# Patient Record
Sex: MALE | Race: WHITE | Employment: OTHER | URBAN - METROPOLITAN AREA
[De-identification: names, ages, dates, MRNs, and addresses within clinical notes are randomized per-mention and may not be internally consistent; named-entity substitution may affect disease eponyms.]

---

## 2018-07-23 ENCOUNTER — HOSPITAL ENCOUNTER (OUTPATIENT)
Dept: CT IMAGING | Age: 63
Discharge: HOME | End: 2018-07-23
Attending: FAMILY MEDICINE
Payer: SELF-PAY

## 2018-07-23 DIAGNOSIS — R93.1 ELEVATED CORONARY ARTERY CALCIUM SCORE: ICD-10-CM

## 2018-07-23 PROCEDURE — 75571 CT HRT W/O DYE W/CA TEST: CPT

## 2018-07-30 NOTE — PROGRESS NOTES
Patient reviewed Calcium score results in depth with Dr. Mitchel Portillo today. Lipitor dose increased and patient would like to proceed with referral to [facility].

## 2018-11-07 ENCOUNTER — HOSPITAL ENCOUNTER (OUTPATIENT)
Dept: LAB | Age: 63
Discharge: HOME | End: 2018-11-07

## 2018-11-07 PROCEDURE — 88305 TISSUE EXAM BY PATHOLOGIST: CPT

## 2018-12-18 ENCOUNTER — HOSPITAL ENCOUNTER (OUTPATIENT)
Dept: RADIATION ONCOLOGY | Age: 63
Discharge: HOME | End: 2018-12-18
Payer: COMMERCIAL

## 2018-12-18 VITALS
DIASTOLIC BLOOD PRESSURE: 73 MMHG | SYSTOLIC BLOOD PRESSURE: 135 MMHG | WEIGHT: 208.8 LBS | HEART RATE: 78 BPM | RESPIRATION RATE: 18 BRPM | OXYGEN SATURATION: 97 % | TEMPERATURE: 98.3 F | BODY MASS INDEX: 29.96 KG/M2

## 2018-12-18 PROCEDURE — 99211 OFF/OP EST MAY X REQ PHY/QHP: CPT

## 2018-12-18 NOTE — CONSULTS
Patient: Mary Webb MRN: [id number]  SSN: [id number]    YOB: 1955  Age: 58 y.o. Sex: male      Other Providers:    MD Zaida Augustin DO    CHIEF COMPLAINT: Prostate cancer    DIAGNOSIS: Adenocarcinoma of the prostate, T1c, [place] score 3+4 = 7, PSA 5.4      HISTORY OF PRESENT ILLNESS:  Mary Webb is a 58 y.o. male who I am seeing at the request of Dr. Sharon Hardwick for discussion of manage options for this intermediate risk prostate cancer. He has a history of penile cancer s/p local excision. The patient has the following PSA history:  PSA 5. 4 July 2018  PSA 4.0 July 2017  PSA 3. 5 July 2016  PSA 2. 9 July 2015  PSA 2. 6 June 2014  Based on his PSA rise, he was sent to Dr. Sharon Hardwick for evaluation. Biopsy on 11/07/18 revealed [place] score 3+4 = 7 adenocarcinoma in the right mid gland (3/3 cores, 90%, 75%, 50%) pattern 4 was 5%, PNI identified, and the right apex (<10%) pattern 4 was <5%. Prostate volume was 37.5 mL for a PSA density of 0.14. He discussed management options with Dr. Sharon Hardwick. He was then seen by Dr. Yaz Lundberg for discussion of RALP. He was felt to be a good surgical candidate. He is scheduled for an MRI of the pelvis on 01/03/19. He is here today for discussion of radiation based management further centimeters prostate cancer. At this time, Mr. Teetee Thomas Is doing very well. He has excellent performance status. He denies significant urinary symptoms. He has good bowel function. He has good erectile function.       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 PAST SURGICAL HISTORY:   Past Surgical History:   Procedure Laterality Date    HX COLONOSCOPY  2016    f/u 5 years    HX ENDOSCOPY  2/2011    HX ORTHOPAEDIC Bilateral 2011    Achilles Tendon Repair    HX OTHER SURGICAL  2010    Squamous cell ca removed from penis    REPAIR/GRAFT ACHILLES TENDON      right 1987; left 10/2011       MEDICATIONS:     Current Outpatient Medications:     cholecalciferol (VITAMIN D3) 1,000 unit cap, Take  by mouth daily. , Disp: , Rfl:     atorvastatin (LIPITOR) 80 mg tablet, Take 1 Tab by mouth daily. , Disp: 90 Tab, Rfl: 1    zolpidem (AMBIEN) 10 mg tablet, Take 0.5-1 Tabs by mouth nightly as needed for Sleep. Max Daily Amount: 10 mg., Disp: 30 Tab, Rfl: 5    amLODIPine-benazepril (LOTREL) 5-10 mg per capsule, Take 1 Cap by mouth nightly., Disp: 90 Cap, Rfl: 3    FLAXSEED OIL PO, Take  by mouth., Disp: , Rfl:     aspirin 81 mg chewable tablet, Take 81 mg by mouth daily. , Disp: , Rfl:     CYANOCOBALAMIN, VITAMIN B-12, (VITAMIN B-12 PO), Take 1 Tab by mouth every morning., Disp: , Rfl:     OMEGA-3 FATTY ACIDS (FISH OIL CONCENTRATE PO), Take 1 Cap by mouth every morning. Hold until after surgery , Disp: , Rfl:     vitamin E (AQUA GEMS) 400 unit capsule, Take 400 Units by mouth every morning.  Hold until after surgery , Disp: , Rfl:     ascorbic acid (VITAMIN C) 500 mg tablet, Take 500 mg by mouth every morning., Disp: , Rfl:     ALLERGIES:   No Known Allergies    SOCIAL HISTORY:   Social History     Socioeconomic History    Marital status:      Spouse name: Not on file    Number of children: Not on file    Years of education: Not on file    Highest education level: Not on file   Social Needs    Financial resource strain: Not on file    Food insecurity - worry: Not on file    Food insecurity - inability: Not on file   [employer] needs - medical: Not on file   [employer] needs - non-medical: Not on file   Occupational History    Not on file   Tobacco Use    Smoking status: Former Smoker     Packs/day: 0.25     Years: 5.00     Pack years: 1.25    Smokeless tobacco: Never Used    Tobacco comment: quit in 1980's   Substance and Sexual Activity    Alcohol use: Yes     Alcohol/week: 1.0 oz     Types: 2 Cans of beer per week    Drug use: No    Sexual activity: Not on file   Other Topics Concern    Not on file   Social History Narrative    Not on file       FAMILY HISTORY:   Family History   Problem Relation Age of Onset    Hypertension Mother     Alzheimer Mother     Hypertension Father     Heart Disease Father     Hypertension Sister         controlled with diet       REVIEW OF SYSTEMS: Please see the completed review of systems sheet in the chart that I have reviewed today. PHYSICAL EXAMINATION:   ECOG Performance status 0  VITAL SIGNS:   Visit Vitals  /73   Pulse 78   Temp 98.3 °F (36.8 °C)   Resp 18   Wt 94.7 kg (208 lb 12.8 oz)   SpO2 97%   BMI 29.96 kg/m²        GENERAL: The patient is well-developed, ambulatory, alert and in no acute distress. HEENT: Head is normocephalic, atraumatic. Pupils are equal, round and reactive to light and accommodation. Extraocular movement intact. NECK: Neck is supple with no masses. CARDIOVASCULAR: Heart has regular rate and rhythm. There are no murmurs, rubs or gallops. Radial pulses are 2+ RESPIRATORY: Lungs are clear to auscultation and percussion. There is normal respiratory effort. GASTROINTESTINAL: The abdomen is soft, non-tender, nondistended with no hepatospelnomagaly. Digital rectal examination: Sphincter tone is normal.  There is an approximately 1 cm nodule in the right lateral mid gland. LYMPHATIC: There is no cervical or supraclavicular lymphadenopathy bilaterally. MUSCULOSKELETAL: Extremities reveal no cyanosis, clubbing or edema.  is 5+/5. NEURO:  Cranial nerves II-XII grossly intact.   Muscular strength and sensation are intact throughout all four extremities. PATHOLOGY:    11/07/18:      DIAGNOSIS   A: \"PROSTATE, LEFT BASE, BIOPSY\": PROSTATE TISSUE, NO CARCINOMA IDENTIFIED. B: \"PROSTATE, LEFT MID, BIOPSY\": PROSTATE TISSUE, NO CARCINOMA IDENTIFIED. C: \"PROSTATE, LEFT APEX, BIOPSY\": PROSTATE TISSUE, NO CARCINOMA IDENTIFIED. D: \"PROSTATE, RIGHT BASE, BIOPSY\": PROSTATE TISSUE, NO CARCINOMA IDENTIFIED. E: \"PROSTATE, RIGHT MID, BIOPSY\": PROSTATIC ADENOCARCINOMA, RUTH SCORE   3 + 4 = 7, INVOLVING 3 OF 3 CORES (50%, 75%, 90%). PERINEURAL INVASION PRESENT. GRADE GROUP: 2   PERCENTAGE GRADE 4: 5%   F: \"PROSTATE, RIGHT APEX, BIOPSY\": PROSTATIC ADENOCARCINOMA, RUTH SCORE 3 + 4 = 7, INVOLVING 10% OF FRAGMENTED BIOPSY. GRADE GROUP: 2   PERCENTAGE GRADE 4: LESS THAN 5%       LABORATORY:   Lab Results   Component Value Date/Time    Sodium 144 07/26/2018 09:16 AM    Potassium 5.0 07/26/2018 09:16 AM    Chloride 105 07/26/2018 09:16 AM    CO2 24 07/26/2018 09:16 AM    Anion gap 9 11/14/2011 07:55 PM    Glucose 96 07/26/2018 09:16 AM    BUN 16 07/26/2018 09:16 AM    Creatinine 1.14 07/26/2018 09:16 AM    GFR est AA 78 07/26/2018 09:16 AM    GFR est non-AA 68 07/26/2018 09:16 AM    Calcium 9.4 07/26/2018 09:16 AM    Albumin 4.2 07/26/2018 09:16 AM    Protein, total 6.8 07/26/2018 09:16 AM    Globulin 3.5 11/14/2011 07:55 PM    A-G Ratio 1.6 07/26/2018 09:16 AM    AST (SGOT) 16 07/26/2018 09:16 AM    ALT (SGPT) 12 07/26/2018 09:16 AM     Lab Results   Component Value Date/Time    WBC 5.9 07/26/2018 09:16 AM    HGB 14.0 07/26/2018 09:16 AM    HCT 43.9 07/26/2018 09:16 AM    PLATELET 851 54/07/5331 09:16 AM       RADIOLOGY:    No results found. IMPRESSION:  Michelle Koch is a 58 y.o. male with Adenocarcinoma of the prostate, T1c, Ruth score 3+4 = 7, PSA 5.4. COUNSELING AND COORDINATION OF CARE: I have had a 90 min.  consultation with . Yohana Beltran and his wife, greater than half of which has been spent counseling him about potential management options for this favorable intermediate risk prostate cancer. Diagnostically, he is scheduled to undergo MRI of the pelvis on 01/03/19. According to the [facility] Prediction Tool, his probability of organ confined disease is 48%, of extracapsular extension 51%, of seminal vesicle invasion is 3% and of lymph node involvement is 3%. I have discussed various treatment options with Mr. Nicky Martinez including active surveillance, surgery, and radiation including external beam and brachytherapy, as well as cryotherapy, HIFU and ADT. He has a life expectancy of at least 10 years. We have discussed active surveillance in detail. We have also discussed surgery and Mr. Nicky Martinez has discussed this in detail with Dr. Loreta Morgan. He told that he is an excellent surgical candidate. We have discussed radiation in its various forms. He is potentially a reasonable candidate for IMRT/IGRT with or without an HDR brachytherapy boost with or without 6 months of ADT. He is also a good candidate for hypofractionated radiation using SBRT. We have discussed recent data demonstrating excellent biochemical control for patients with [place] score 7 prostate cancer treated with this approach. Despite having 4 cores of GS 3+4 disease, he has a low burden of pattern 4 disease (5%). We have had a detailed discussion about the use of androgen deprivation therapy as a component of definitive treatment of prostate cancer when combined with radiation. We discussed androgen deprivation therapy and the potential side effects associated with it. These include, but are not limited to, hot flashes, fatigue, weight gain, loss of libido, cognitive and mood changes, decreased bone density and increased fracture risk, altered lipid metabolism, decreased insulin sensitivity, and potential for early death, from cardiac or other unexplained causes.   He is adamantly opposed to the use of ADT as a component of his therapy. I have also discussed with him RTOG protocol 0815, a phase 3 prospective randomized trial of dose escalated radiotherapy with or without short-term androgen deprivation therapy for patients with intermediate risk prostate cancer. This trial has closed to accrual, but no results have yet been published. We have discussed the potential side effects of radiation therapy as they pertain to urinary function, bowel function and erectile function. We have also discussed the potential use of SpaceOAR injectable hydrogel in conjunction with radiation therapy in order to protect the rectum from radiation dose. He is very interested in this approach. I will present Mr. Dede Longoria case at the multidisciplinary conference for discussion of management options. He is deciding primarily between CyberKnife SBRT in conjunction with SpaceOAR versus RALP. He will return for follow-up after presentation at [place] [id number]. I appreciate the opportunity to participate in Mr. Dede Longoria care.     Vida Lyon MD   December 18, 2018        CC:  DO Amandeep Burroughs MD

## 2018-12-18 NOTE — PROGRESS NOTES
Pt here today for initial RT consult for prostate cancer with Dr. Aydin Hendrickson. Pt has discussed RALP with Dr. Chong Tim and stated that he is leaning toward the surgery. He will complete an MRI Pelvis on 1/3/19. Pt has a Ruth score of 7. An overview of RT was given. Pt will complete an MRI Pelvis on 1/3/19 and be presented at [facility] on 1/15/19. He will return after [facility] for FUP and to discuss the treatment options. [facility] form was faxed.

## 2019-01-03 ENCOUNTER — HOSPITAL ENCOUNTER (OUTPATIENT)
Dept: MRI IMAGING | Age: 64
Discharge: HOME | End: 2019-01-03
Attending: UROLOGY
Payer: COMMERCIAL

## 2019-01-03 DIAGNOSIS — C61 PROSTATE CANCER (HCC): ICD-10-CM

## 2019-01-03 PROCEDURE — 74011250636 HC RX REV CODE- 250/636: Performed by: UROLOGY

## 2019-01-03 PROCEDURE — 74011000258 HC RX REV CODE- 258: Performed by: UROLOGY

## 2019-01-03 PROCEDURE — A9575 INJ GADOTERATE MEGLUMI 0.1ML: HCPCS | Performed by: UROLOGY

## 2019-01-03 PROCEDURE — 72197 MRI PELVIS W/O & W/DYE: CPT

## 2019-01-03 RX ORDER — GADOTERATE MEGLUMINE 376.9 MG/ML
18 INJECTION INTRAVENOUS
Status: COMPLETED | OUTPATIENT
Start: 2019-01-03 | End: 2019-01-03

## 2019-01-03 RX ORDER — SODIUM CHLORIDE 0.9 % (FLUSH) 0.9 %
10 SYRINGE (ML) INJECTION
Status: COMPLETED | OUTPATIENT
Start: 2019-01-03 | End: 2019-01-03

## 2019-01-03 RX ADMIN — Medication 10 ML: at 12:04

## 2019-01-03 RX ADMIN — SODIUM CHLORIDE 100 ML: 900 INJECTION, SOLUTION INTRAVENOUS at 12:04

## 2019-01-03 RX ADMIN — GADOTERATE MEGLUMINE 18 ML: 376.9 INJECTION INTRAVENOUS at 12:04

## 2019-01-16 ENCOUNTER — HOSPITAL ENCOUNTER (OUTPATIENT)
Dept: RADIATION ONCOLOGY | Age: 64
Discharge: HOME | End: 2019-01-16
Payer: COMMERCIAL

## 2019-01-16 VITALS
HEART RATE: 87 BPM | OXYGEN SATURATION: 97 % | WEIGHT: 204.6 LBS | RESPIRATION RATE: 18 BRPM | BODY MASS INDEX: 29.36 KG/M2 | SYSTOLIC BLOOD PRESSURE: 135 MMHG | TEMPERATURE: 97.6 F | DIASTOLIC BLOOD PRESSURE: 80 MMHG

## 2019-01-16 PROCEDURE — 99211 OFF/OP EST MAY X REQ PHY/QHP: CPT

## 2019-01-16 NOTE — PROGRESS NOTES
Pt here today for treatment discussion for prostate cancer. His case was presented at [place] [id number] and the consensus according to Dr. Jamshid Briggs was for pt to have Cyberknife, though a RALP is still an option. The 1/3/19 MRI Pelvis indicated extracapsular extension. The 9/24/18 PSA was 5.4. Pt has not decided which option he wants to choose, he will contact Dr. Jamshid Briggs with his decision.

## 2019-01-16 NOTE — PROGRESS NOTES
Patient: Claudia Nunez MRN: [id number]  SSN: [id number] YOB: 1955  Age: 58 y.o. Sex: male Other Providers:    MD Moe Garcia DO 
 
CHIEF COMPLAINT: Prostate cancer DIAGNOSIS: Adenocarcinoma of the prostate, T2a, [place] score 3+4 = 7, PSA 5.4 HISTORY OF PRESENT ILLNESS:  Claudia Nunez is a 58 y.o. male who I am seeing at the request of Dr. Rafael Person for discussion of manage options for this intermediate risk prostate cancer. He has a history of penile cancer s/p local excision. The patient has the following PSA history: PSA 5. 4 July 2018 PSA 4.0 July 2017 PSA 3. 5 July 2016 PSA 2. 9 July 2015 PSA 2. 6 June 2014 Based on his PSA rise, he was sent to Dr. Rafael Person for evaluation. Biopsy on 11/07/18 revealed Ruth score 3+4 = 7 adenocarcinoma in the right mid gland (3/3 cores, 90%, 75%, 50%) pattern 4 was 5%, PNI identified, and the right apex (<10%) pattern 4 was <5%. Prostate volume was 37.5 mL for a PSA density of 0.14. He discussed management options with Dr. Rafael Person. He was then seen by Dr. Saskia Lee for discussion of RALP. He was felt to be a good surgical candidate. He is scheduled for an MRI of the pelvis on 01/03/19. He is here today for discussion of radiation based management further centimeters prostate cancer. INTERVAL HISTORY: Mr. Jameel Olson returns for further discussion of his intermediate risk prostate cancer. MRI of the pelvis on 01/03/19 showed bilateral extensive peripheral zone signal abnormalities worrisome of tumor. Asymmetric prominent material was seen posterolaterally at the right apex which had PI-RADS 5 characteristics. There was suspicion of GAUDENCIO at this area based on the abutment to the capsule. There was no evidence of seminal vesicle invasion or pelvic LAD. I presented his case at [place] [id number]. MRI was reviewed extensively.  The radiologist felt that there no gross GAUDENCIO in the area of concern at the right apex. Treatment recommendations from the group included RALP versus CyberKnife SBRT. Other radiation-based options were also felt to be reasonable including use of HDR brachytherapy boost in conjunction with external beam radiation therapy with or without 6 months of ADT. At this time, Mr. Tamanna Edmond Is doing very well. He has excellent performance status. He denies significant urinary symptoms. He has good bowel function. He has good erectile function. PAST MEDICAL HISTORY:   
Past Medical History:  
Diagnosis Date  [facility] coronary artery calcium score between 100 and 199 2016 Calcium score of 665- f/u with Cardio  DVT (deep venous thrombosis) ([facility] [address] 11/14/2011 11/2011: LLE DVT  Hypercholesteremia  Hyperlipidemia 11/14/2011  Hypertension  Insomnia  Squamous cell carcinoma   
 on the penis The patient denies history of collagen vascular diseases, pacemaker insertion, prior radiation or prior chemotherapy. PAST SURGICAL HISTORY:  
Past Surgical History:  
Procedure Laterality Date  HX COLONOSCOPY  2016  
 f/u 5 years  HX ENDOSCOPY  2/2011  HX ORTHOPAEDIC Bilateral 2011 Achilles Tendon Repair  HX OTHER SURGICAL  2010 Squamous cell ca removed from penis  REPAIR/GRAFT ACHILLES TENDON    
 right 1987; left 10/2011 MEDICATIONS:  
 
Current Outpatient Medications:  
  cholecalciferol (VITAMIN D3) 1,000 unit cap, Take  by mouth daily. , Disp: , Rfl:  
  atorvastatin (LIPITOR) 80 mg tablet, Take 1 Tab by mouth daily. , Disp: 90 Tab, Rfl: 1 
  zolpidem (AMBIEN) 10 mg tablet, Take 0.5-1 Tabs by mouth nightly as needed for Sleep. Max Daily Amount: 10 mg., Disp: 30 Tab, Rfl: 5 
  amLODIPine-benazepril (LOTREL) 5-10 mg per capsule, Take 1 Cap by mouth nightly., Disp: 90 Cap, Rfl: 3 
  FLAXSEED OIL PO, Take  by mouth., Disp: , Rfl:  
  aspirin 81 mg chewable tablet, Take 81 mg by mouth daily. , Disp: , Rfl:  
   CYANOCOBALAMIN, VITAMIN B-12, (VITAMIN B-12 PO), Take 1 Tab by mouth every morning., Disp: , Rfl:  
  OMEGA-3 FATTY ACIDS (FISH OIL CONCENTRATE PO), Take 1 Cap by mouth every morning. Hold until after surgery , Disp: , Rfl:  
  vitamin E (AQUA GEMS) 400 unit capsule, Take 400 Units by mouth every morning. Hold until after surgery , Disp: , Rfl:  
  ascorbic acid (VITAMIN C) 500 mg tablet, Take 500 mg by mouth every morning., Disp: , Rfl: ALLERGIES:  
No Known Allergies SOCIAL HISTORY:  
Social History Socioeconomic History  Marital status:  Spouse name: Not on file  Number of children: Not on file  Years of education: Not on file  Highest education level: Not on file Social Needs  Financial resource strain: Not on file  Food insecurity - worry: Not on file  Food insecurity - inability: Not on file  Transportation needs - medical: Not on file  Transportation needs - non-medical: Not on file Occupational History  Not on file Tobacco Use  Smoking status: Former Smoker Packs/day: 0.25 Years: 5.00 Pack years: 1.25  Smokeless tobacco: Never Used  Tobacco comment: quit in 1980's Substance and Sexual Activity  Alcohol use: Yes Alcohol/week: 1.0 oz Types: 2 Cans of beer per week  Drug use: No  
 Sexual activity: Not on file Other Topics Concern  Not on file Social History Narrative  Not on file FAMILY HISTORY:  
Family History Problem Relation Age of Onset  Hypertension Mother  Alzheimer Mother  Hypertension Father  Heart Disease Father  Hypertension Sister   
     controlled with diet REVIEW OF SYSTEMS: Please see the completed review of systems sheet in the chart that I have reviewed today. PHYSICAL EXAMINATION:  
ECOG Performance status 0 
VITAL SIGNS:  
Visit Vitals /80 Pulse 87 Temp 97.6 °F (36.4 °C) Resp 18 Wt 92.8 kg (204 lb 9.6 oz) SpO2 97% BMI 29.36 kg/m² GENERAL: The patient is well-developed, ambulatory, alert and in no acute distress. PATHOLOGY:   
11/07/18:  
 
 DIAGNOSIS  
A: \"PROSTATE, LEFT BASE, BIOPSY\": PROSTATE TISSUE, NO CARCINOMA IDENTIFIED. B: \"PROSTATE, LEFT MID, BIOPSY\": PROSTATE TISSUE, NO CARCINOMA IDENTIFIED. C: \"PROSTATE, LEFT APEX, BIOPSY\": PROSTATE TISSUE, NO CARCINOMA IDENTIFIED. D: \"PROSTATE, RIGHT BASE, BIOPSY\": PROSTATE TISSUE, NO CARCINOMA IDENTIFIED. E: \"PROSTATE, RIGHT MID, BIOPSY\": PROSTATIC ADENOCARCINOMA, RUTH SCORE  
3 + 4 = 7, INVOLVING 3 OF 3 CORES (50%, 75%, 90%). PERINEURAL INVASION PRESENT. GRADE GROUP: 2 PERCENTAGE GRADE 4: 5%  
F: \"PROSTATE, RIGHT APEX, BIOPSY\": PROSTATIC ADENOCARCINOMA, RUTH SCORE 3 + 4 = 7, INVOLVING 10% OF FRAGMENTED BIOPSY. GRADE GROUP: 2 PERCENTAGE GRADE 4: LESS THAN 5% LABORATORY:  
Lab Results Component Value Date/Time Sodium 144 07/26/2018 09:16 AM  
 Potassium 5.0 07/26/2018 09:16 AM  
 Chloride 105 07/26/2018 09:16 AM  
 CO2 24 07/26/2018 09:16 AM  
 Anion gap 9 11/14/2011 07:55 PM  
 Glucose 96 07/26/2018 09:16 AM  
 BUN 16 07/26/2018 09:16 AM  
 Creatinine 1.14 07/26/2018 09:16 AM  
 GFR est AA 78 07/26/2018 09:16 AM  
 GFR est non-AA 68 07/26/2018 09:16 AM  
 Calcium 9.4 07/26/2018 09:16 AM  
 Albumin 4.2 07/26/2018 09:16 AM  
 Protein, total 6.8 07/26/2018 09:16 AM  
 Globulin 3.5 11/14/2011 07:55 PM  
 A-G Ratio 1.6 07/26/2018 09:16 AM  
 AST (SGOT) 16 07/26/2018 09:16 AM  
 ALT (SGPT) 12 07/26/2018 09:16 AM  
 
Lab Results Component Value Date/Time WBC 5.9 07/26/2018 09:16 AM  
 HGB 14.0 07/26/2018 09:16 AM  
 HCT 43.9 07/26/2018 09:16 AM  
 PLATELET 126 94/26/4402 09:16 AM  
 
 
RADIOLOGY:   
 
01/03/19: MRI Prostate without and with Contrast 1/3/2019 12:05 PM 
  
Indication: Prostate cancer staging, recent biopsy done 11/7/2018 showed Ruth 
score 7 disease in the right mid gland. PSA July 2018 5. 4. 
  
 Comparison: None available at this hospital PACS system 
  
Technique:  Multiplanar T2, diffusion, pre and post contrast T1 and multi-phase 
dynamic post contrast fat-suppressed T1 sequences. 18 mL  Dotarem iv contrast 
given. 
  
Findings: 
Prostate size: Gland measures 5.0 cm craniocaudal, 5.8 cm transverse and 3.7 cm 
AP. Finding is estimated at 56 g. 
  
Peripheral Zone: There is good preservation of peripheral zone tissue. Bilaterally from the base to the apex T2 and ADC imaging shows heterogeneous 
signal. Posterior laterally at the mid gland on both the left and the right 
regions are somewhat prominent but most prominent abnormality is posterior 
laterally at the right apex. There is a strongly T2 and ADC hypointense focus on 
ADC measuring up to 18 mm which is diffusion hyperintense and shows strong 
postcontrast enhancement-Pirads 5. In this region as well the material and 
especially enhancement shows asymmetric prominence posterior laterally and this 
is very suspicious for an element of extracapsular extension. Bilaterally in the 
peripheral zone there are extensive regions of significant enhancement. 
  
Central Zone: Not enlarged, Little BPH changes. 
  
Prostate Capsule: Away from the right apex prostate capsule appears intact.  
  
Neurovascular Bundles: The abnormal material at the right apex region does come 
in proximity to the neurovascular bundle. 
  
Seminal Vesicles: Symmetric in size, normal signal with no abnormal enhancement. 
  
Lymph Nodes: No enlarged enhancing pelvic adenopathy seen. 
  
Bones: No aggressive hyperenhancing bone lesion seen. 
  
Extra- Prostate Findings: Bladder midline, incompletely distended with no focal 
abnormality. No discrete lesion seen at the rectosigmoid colon and the pelvic 
bowel loops are unremarkable. No vascular lesion seen. 
  
IMPRESSION IMPRESSION: 
1. Bilateral extensive prostate peripheral zone signal abnormalities worrisome for tumor. There is asymmetric prominent material posterior laterally at the 
right apex which has Pirads 5 characteristics. The findings in this region very 
suspicious for extracapsular extension as well. Some of this material does come 
in proximity to the position of the neurovascular bundle at the apex. Elsewhere 
changes are well evident bilaterally at the mid gland as well. 2. I do not see evidence for metastatic lymphadenopathy or metastatic bone 
disease on this exam. 
 
 
IMPRESSION:  Claudia Nunez is a 58 y.o. male with Adenocarcinoma of the prostate, T2a, Ruth score 3+4 = 7, PSA 5.4. COUNSELING AND COORDINATION OF CARE: I have had a 60 min. Follow-up with Mr. Jameel Olson and his wife, greater than half of which has been spent counseling him about potential management options for this favorable intermediate risk prostate cancer. MRI of the pelvis on 01/03/19 showed bilateral extensive peripheral zone signal abnormalities worrisome of tumor. Asymmetric prominent material was seen posterolaterally at the right apex which had PI-RADS 5 characteristics. There was suspicion of GAUDENCIO at this area based on the abutment to the capsule. There was no evidence of seminal vesicle invasion or pelvic LAD. I presented his case at [place] [id number]. MRI was reviewed extensively. The radiologist felt that there no gross GAUDENCIO in the area of concern at the right apex. Treatment recommendations from the group included RALP versus CyberKnife SBRT. Other radiation-based options were also felt to be reasonable including use of HDR brachytherapy boost in conjunction with external beam radiation therapy with or without 6 months of ADT. According to the [facility] Prediction Tool, his probability of organ confined disease is 48%, of extracapsular extension 51%, of seminal vesicle invasion is 3% and of lymph node involvement is 3%.  I have discussed various treatment options with  Shanthi including active surveillance, surgery, and radiation including external beam and brachytherapy, as well as cryotherapy, HIFU and ADT. He has a life expectancy of at least 10 years. We have discussed active surveillance in detail. We have also discussed surgery and Mr. Aris Salazar has discussed this in detail with Dr. Brook Thompson. He told that he is an excellent surgical candidate. We have again discussed radiation in its various forms. He is potentially a reasonable candidate for IMRT/IGRT with or without an HDR brachytherapy boost with or without 6 months of ADT. He is also a good candidate for hypofractionated radiation using SBRT. We have discussed recent data demonstrating excellent biochemical control for patients with Ruth score 7 prostate cancer treated with this approach. Despite having 4 cores of GS 3+4 disease, he has a low burden of pattern 4 disease (5%). We have discussed the potential side effects of radiation therapy as they pertain to urinary function, bowel function and erectile function. We have also discussed the potential use of SpaceOAR injectable hydrogel in conjunction with radiation therapy in order to protect the rectum from radiation dose. He is very interested in this approach. He is deciding primarily between CyberKnife SBRT in conjunction with SpaceOAR versus RALP. He will contact me once he has made a decision regarding treatment. I appreciate the opportunity to participate in Mr. Shila cabrales. Martha Koch MD  
January 16, 2019 CC:  Severiano Pippin, DO Addison Ranks, MD

## 2019-04-30 DIAGNOSIS — C61 PROSTATE CANCER (HCC): Primary | ICD-10-CM

## 2019-05-01 ENCOUNTER — HOSPITAL ENCOUNTER (OUTPATIENT)
Dept: RADIATION ONCOLOGY | Age: 64
Discharge: HOME | End: 2019-05-01
Payer: COMMERCIAL

## 2019-05-01 DIAGNOSIS — C61 PROSTATE CANCER (HCC): ICD-10-CM

## 2019-05-01 PROCEDURE — 84403 ASSAY OF TOTAL TESTOSTERONE: CPT

## 2019-05-01 PROCEDURE — 36415 COLL VENOUS BLD VENIPUNCTURE: CPT

## 2019-05-01 PROCEDURE — 84153 ASSAY OF PSA TOTAL: CPT

## 2019-05-02 LAB
PSA SERPL DL<=0.01 NG/ML-MCNC: 2.79 NG/ML (ref 0–4)
TESTOST SERPL-MCNC: 341 NG/DL (ref 264–916)

## 2019-05-07 ENCOUNTER — HOSPITAL ENCOUNTER (OUTPATIENT)
Dept: RADIATION ONCOLOGY | Age: 64
Discharge: HOME | End: 2019-05-07
Payer: COMMERCIAL

## 2019-05-07 VITALS
HEART RATE: 82 BPM | OXYGEN SATURATION: 98 % | SYSTOLIC BLOOD PRESSURE: 129 MMHG | DIASTOLIC BLOOD PRESSURE: 76 MMHG | TEMPERATURE: 98 F

## 2019-05-07 DIAGNOSIS — N39.0 URINARY TRACT INFECTION WITHOUT HEMATURIA, SITE UNSPECIFIED: Primary | ICD-10-CM

## 2019-05-07 DIAGNOSIS — N39.0 URINARY TRACT INFECTION WITHOUT HEMATURIA, SITE UNSPECIFIED: ICD-10-CM

## 2019-05-07 DIAGNOSIS — C61 PROSTATE CANCER (HCC): ICD-10-CM

## 2019-05-07 LAB
APPEARANCE UR: CLEAR
BILIRUB UR QL: NEGATIVE
COLOR UR: YELLOW
GLUCOSE UR STRIP.AUTO-MCNC: NEGATIVE MG/DL
HGB UR QL STRIP: NEGATIVE
KETONES UR QL STRIP.AUTO: NEGATIVE MG/DL
LEUKOCYTE ESTERASE UR QL STRIP.AUTO: NEGATIVE
NITRITE UR QL STRIP.AUTO: NEGATIVE
PH UR STRIP: 5.5 [PH] (ref 5–9)
PROT UR STRIP-MCNC: NEGATIVE MG/DL
SP GR UR REFRACTOMETRY: <=1.005 (ref 1–1.02)
UROBILINOGEN UR QL STRIP.AUTO: 0.2 EU/DL (ref 0.2–1)

## 2019-05-07 PROCEDURE — 99211 OFF/OP EST MAY X REQ PHY/QHP: CPT

## 2019-05-07 PROCEDURE — 81003 URINALYSIS AUTO W/O SCOPE: CPT

## 2019-05-07 RX ORDER — ASPIRIN 81 MG/1
TABLET ORAL DAILY
COMMUNITY

## 2019-05-07 NOTE — NURSE NAVIGATOR
F/u prostate cancer. Aua/Epic completed. Space Oar 2-13-19. S/p Cyberknife at [facility] 3-20-19. Labs 5-1-19. F/u Dr. Jolanta Rodriguez 8-26-19. Pt treated for UTI. C/o mucus in stools. Difficult to control during urination. Repeat U/A today. Culture if needed.  
 
Robert Shoemaker RN

## 2019-05-07 NOTE — PROGRESS NOTES
Patient: Vannesa Aaron MRN: [id number]  SSN: [id number] YOB: 1955  Age: 58 y.o. Sex: male Other Providers:    MD Yair Casey DO 
 
CHIEF COMPLAINT: Prostate cancer DIAGNOSIS: Adenocarcinoma of the prostate, T2a, Natalbany score 3+4 = 7, PSA 5.4 SITE TREATED AND DOSE DELIVERED: Mr. Eitan August received CyberKnife SBRT delivering 4000 cGy in 5 fractions of 800 cGy to the prostate using 6 MV photons prescribed to the 87.8% isodose line. The treatment required 57 beams and had an estimated treatment time of 33 minutes per fraction. Treatment was delivered from 03/11/19 through 03/20/19. HISTORY OF PRESENT ILLNESS:  Vannesa Aaron is a 58 y.o. male who I am seeing at the request of Dr. Marcos Garcia for discussion of manage options for this intermediate risk prostate cancer. He has a history of penile cancer s/p local excision. The patient has the following PSA history: PSA 5. 4 July 2018 PSA 4.0 July 2017 PSA 3. 5 July 2016 PSA 2. 9 July 2015 PSA 2. 6 June 2014 Based on his PSA rise, he was sent to Dr. Marcos Garcia for evaluation. Biopsy on 11/07/18 revealed Natalbany score 3+4 = 7 adenocarcinoma in the right mid gland (3/3 cores, 90%, 75%, 50%) pattern 4 was 5%, PNI identified, and the right apex (<10%) pattern 4 was <5%. Prostate volume was 37.5 mL for a PSA density of 0.14. He discussed management options with Dr. Marcos Garcia. He was then seen by Dr. Gomez Shen for discussion of RALP. He was felt to be a good surgical candidate. He is scheduled for an MRI of the pelvis on 01/03/19. He is here today for discussion of radiation based management further centimeters prostate cancer. MRI of the pelvis on 01/03/19 showed bilateral extensive peripheral zone signal abnormalities worrisome of tumor. Asymmetric prominent material was seen posterolaterally at the right apex which had PI-RADS 5 characteristics.  There was suspicion of GAUDENCIO at this area based on the abutment to the capsule. There was no evidence of seminal vesicle invasion or pelvic LAD. I presented his case at [place] [id number]. MRI was reviewed extensively. The radiologist felt that there no gross GAUDENCIO in the area of concern at the right apex. Treatment recommendations from the group included RALP versus CyberKnife SBRT. Other radiation-based options were also felt to be reasonable including use of HDR brachytherapy boost in conjunction with external beam radiation therapy with or without 6 months of ADT. He decided to move forward with CyberKnife SBRT. He also decided to include placement of SpaceOAR hydrogel for protection of the rectal wall along with placement of fiducial markers for image guidance. He underwent this procedure, 2/13/2019. 
  
Mr. Maki then received CyberKnife SBRT delivering 4000 cGy in 5 fractions of 800 cGy to the prostate using 6 MV photons prescribed to the 87.8% isodose line. The treatment required 57 beams and had an estimated treatment time of 33 minutes per fraction. Treatment was delivered from 03/11/19 through 03/20/19.  
 
 
  
INTERVAL HISTORY: Mr. Katja Haider returns for followup 6 weeks after completion of CyberKnife SBRT. He tolerated treatment reasonably well, but beginning 1 week after treatment developed significant rectal discomfort and pain at the base of the scrotum. He has also noticed mucous with bowel movements and decreased caliber stool. He had episodes of tenesmus. He was constipated and tried MiraLax without significant improvement. He was taking ibuprofen 400 mg TID with some relief from discomfort. Also hot baths were helpful. He was diagnosed with a UTI and placed on a 7 day course of cephalexin 500 mg. After a few days on treatment he developed signs of allergy to the medication and was switched to Cipro 500 mg BID ending on 04/14/19. Over the past few weeks he has had soft stools up to x5-6 daily. He was taking probiotics without improvement. He contacted me on 05/04/19 with complaint of \"air bubbles escaping from my urethra when I urinate. \"  He first noticed this following his catheterization for CT simulation. He has since had 3 more episodes. There is no pain or foul odor associated. At his visit today, he continues to have some of these issues, but they are improving. The rectal symptoms including mucous, urgency and frequency are somewhat less. He has not had any recent passage of air bubbles from the urethra.  
  
He has excellent performance status.  He denied significant urinary symptoms prior to treatment. Brandy Naidu had good bowel function prior to treatment. Brandy Naidu had good erectile function. Brandy Naidu has an AUA symptom score of 12/35 and is \"unhappy\" with his urinary function. He has nocturia x3. He has incomplete voiding and daytime frequency more than half the time. He has occasional leakage and dribbling for which he wears 1 pad daily. He has maintained good erectile function. PAST MEDICAL HISTORY:   
Past Medical History:  
Diagnosis Date  Agatston coronary artery calcium score between 100 and 199 2016 Calcium score of 665- f/u with Cardio  DVT (deep venous thrombosis) ([facility] [address] 11/14/2011 11/2011: LLE DVT  Hypercholesteremia  Hyperlipidemia 11/14/2011  Hypertension  Insomnia  Squamous cell carcinoma   
 on the penis PAST SURGICAL HISTORY:  
Past Surgical History:  
Procedure Laterality Date  HX COLONOSCOPY  2016  
 f/u 5 years  HX ENDOSCOPY  2/2011  HX ORTHOPAEDIC Bilateral 2011 Achilles Tendon Repair  HX OTHER SURGICAL  2010 Squamous cell ca removed from penis  REPAIR/GRAFT ACHILLES TENDON    
 right 1987; left 10/2011 MEDICATIONS:  
 
Current Outpatient Medications:  
  aspirin delayed-release 81 mg tablet, Take  by mouth daily. , Disp: , Rfl:  
  B.infantis-B.ani-B.long-B.bifi (PROBIOTIC 4X) 10-15 mg TbEC, Take 1 Tab by mouth daily. , Disp: , Rfl:  
   LORazepam (ATIVAN) 1 mg tablet, 1/2-1 po qhs prn sleep, Disp: 30 Tab, Rfl: 3 
  sucralfate (CARAFATE) 1 gram tablet, Take 1 Tab by mouth Before breakfast, lunch, dinner and at bedtime. , Disp: 120 Tab, Rfl: 5 
  atorvastatin (LIPITOR) 80 mg tablet, Take 1 Tab by mouth daily. , Disp: 90 Tab, Rfl: 1 
  zolpidem (AMBIEN) 10 mg tablet, Take 0.5-1 Tabs by mouth nightly as needed for Sleep. Max Daily Amount: 10 mg., Disp: 30 Tab, Rfl: 5   cholecalciferol (VITAMIN D3) 1,000 unit cap, Take  by mouth daily. , Disp: , Rfl:  
  amLODIPine-benazepril (LOTREL) 5-10 mg per capsule, Take 1 Cap by mouth nightly., Disp: 90 Cap, Rfl: 3 
  FLAXSEED OIL PO, Take 1 Tab by mouth daily. , Disp: , Rfl:  
  CYANOCOBALAMIN, VITAMIN B-12, (VITAMIN B-12 PO), Take 1,000 mcg by mouth every morning., Disp: , Rfl:  
  OMEGA-3 FATTY ACIDS (FISH OIL CONCENTRATE PO), Take 1 Cap by mouth every morning. Hold until after surgery , Disp: , Rfl:  
  vitamin E (AQUA GEMS) 400 unit capsule, Take 400 Units by mouth every morning. Hold until after surgery , Disp: , Rfl:  
  ascorbic acid (VITAMIN C) 500 mg tablet, Take 500 mg by mouth every morning., Disp: , Rfl: ALLERGIES:  
Allergies Allergen Reactions  Keflex [Cephalexin] Itching Palms/feet. Diarrhea. SOCIAL HISTORY:  
Social History Socioeconomic History  Marital status:  Spouse name: Not on file  Number of children: Not on file  Years of education: Not on file  Highest education level: Not on file Occupational History  Not on file Social Needs  Financial resource strain: Not on file  Food insecurity:  
  Worry: Not on file Inability: Not on file  Transportation needs:  
  Medical: Not on file Non-medical: Not on file Tobacco Use  Smoking status: Former Smoker Packs/day: 0.25 Years: 5.00 Pack years: 1.25  Smokeless tobacco: Never Used  Tobacco comment: quit in 1980's Substance and Sexual Activity  Alcohol use: Yes Alcohol/week: 1.0 oz Types: 2 Cans of beer per week  Drug use: No  
  Types: Prescription  Sexual activity: Not on file Lifestyle  Physical activity:  
  Days per week: Not on file Minutes per session: Not on file  Stress: Not on file Relationships  Social connections:  
  Talks on phone: Not on file Gets together: Not on file Attends Tenriism service: Not on file Active member of club or organization: Not on file Attends meetings of clubs or organizations: Not on file Relationship status: Not on file  Intimate partner violence:  
  Fear of current or ex partner: Not on file Emotionally abused: Not on file Physically abused: Not on file Forced sexual activity: Not on file Other Topics Concern  Not on file Social History Narrative  Not on file FAMILY HISTORY:  
Family History Problem Relation Age of Onset  Hypertension Mother  Alzheimer Mother  Hypertension Father  Heart Disease Father  Hypertension Sister   
     controlled with diet REVIEW OF SYSTEMS: Please see the completed review of systems sheet in the chart that I have reviewed today. PHYSICAL EXAMINATION:  
ECOG Performance status 0 
VITAL SIGNS:  
Visit Vitals /76 (BP 1 Location: Left arm, BP Patient Position: Sitting) Pulse 82 Temp 98 °F (36.7 °C) SpO2 98% GENERAL: The patient is well-developed, ambulatory, alert and in no acute distress. PATHOLOGY:   
11/07/18:  
 
 DIAGNOSIS  
A: \"PROSTATE, LEFT BASE, BIOPSY\": PROSTATE TISSUE, NO CARCINOMA IDENTIFIED. B: \"PROSTATE, LEFT MID, BIOPSY\": PROSTATE TISSUE, NO CARCINOMA IDENTIFIED. C: \"PROSTATE, LEFT APEX, BIOPSY\": PROSTATE TISSUE, NO CARCINOMA IDENTIFIED. D: \"PROSTATE, RIGHT BASE, BIOPSY\": PROSTATE TISSUE, NO CARCINOMA IDENTIFIED. E: \"PROSTATE, RIGHT MID, BIOPSY\": PROSTATIC ADENOCARCINOMA, CATHY SCORE  
 3 + 4 = 7, INVOLVING 3 OF 3 CORES (50%, 75%, 90%). PERINEURAL INVASION PRESENT. GRADE GROUP: 2 PERCENTAGE GRADE 4: 5%  
F: \"PROSTATE, RIGHT APEX, BIOPSY\": PROSTATIC ADENOCARCINOMA, RUTH SCORE 3 + 4 = 7, INVOLVING 10% OF FRAGMENTED BIOPSY. GRADE GROUP: 2 PERCENTAGE GRADE 4: LESS THAN 5% LABORATORY:  
05/01/19: PSA 2.790, testosterone 341. RADIOLOGY:   
 
01/03/19: MRI Prostate without and with Contrast 1/3/2019 12:05 PM 
  
Indication: Prostate cancer staging, recent biopsy done 11/7/2018 showed Ruth 
score 7 disease in the right mid gland. PSA July 2018 5. 4. 
  
Comparison: None available at this hospital PACS system 
  
Technique:  Multiplanar T2, diffusion, pre and post contrast T1 and multi-phase 
dynamic post contrast fat-suppressed T1 sequences. 18 mL  Dotarem iv contrast 
given. 
  
Findings: 
Prostate size: Gland measures 5.0 cm craniocaudal, 5.8 cm transverse and 3.7 cm 
AP. Finding is estimated at 56 g. 
  
Peripheral Zone: There is good preservation of peripheral zone tissue. Bilaterally from the base to the apex T2 and ADC imaging shows heterogeneous 
signal. Posterior laterally at the mid gland on both the left and the right 
regions are somewhat prominent but most prominent abnormality is posterior 
laterally at the right apex. There is a strongly T2 and ADC hypointense focus on 
ADC measuring up to 18 mm which is diffusion hyperintense and shows strong 
postcontrast enhancement-Pirads 5. In this region as well the material and 
especially enhancement shows asymmetric prominence posterior laterally and this 
is very suspicious for an element of extracapsular extension. Bilaterally in the 
peripheral zone there are extensive regions of significant enhancement. 
  
Central Zone: Not enlarged, Little BPH changes. 
  
Prostate Capsule: Away from the right apex prostate capsule appears intact.  
  
Neurovascular Bundles: The abnormal material at the right apex region does come in proximity to the neurovascular bundle. 
  
Seminal Vesicles: Symmetric in size, normal signal with no abnormal enhancement. 
  
Lymph Nodes: No enlarged enhancing pelvic adenopathy seen. 
  
Bones: No aggressive hyperenhancing bone lesion seen. 
  
Extra- Prostate Findings: Bladder midline, incompletely distended with no focal 
abnormality. No discrete lesion seen at the rectosigmoid colon and the pelvic 
bowel loops are unremarkable. No vascular lesion seen. 
  
IMPRESSION IMPRESSION: 
1. Bilateral extensive prostate peripheral zone signal abnormalities worrisome 
for tumor. There is asymmetric prominent material posterior laterally at the 
right apex which has Pirads 5 characteristics. The findings in this region very 
suspicious for extracapsular extension as well. Some of this material does come 
in proximity to the position of the neurovascular bundle at the apex. Elsewhere 
changes are well evident bilaterally at the mid gland as well. 2. I do not see evidence for metastatic lymphadenopathy or metastatic bone 
disease on this exam. 
 
 
IMPRESSION:  Deena Alvares is a 58 y.o. male with Adenocarcinoma of the prostate, T2a, Dateland score 3+4 = 7, PSA 5.4. He was treated with CyberKnife SBRT completed on 03/20/19. COUNSELING AND COORDINATION OF CARE: I have had a 40 min. Follow-up with Mr. Hyacinth Conroy, greater than half of which has been spent counseling him about potential continued management of this favorable intermediate risk prostate cancer and the side effects associated with treatment. UA today shows no evidence of infection. He will continue Flomax and ibuprofen for  symptoms. For irritative bowel symptoms he will continue ProctoCream, Imodium PRN, and probiotics. He has had a good initial PSA response to radiation therapy. He will contact me in 1 month to update me about side effect issues.  He will return for follow-up in 3 months with PSA and testosterone checked prior to his visit. I appreciate the opportunity to participate in Mr. Oleary [facility] care. Bill Campoverde MD  
May 7, 2019 Portions of this note were copied from prior encounters and reviewed for accuracy, currency, and represent documentation and tasks completed during this encounter. I verify and attest these portions to be unchanged from prior visits. Bill Campoverde MD 
05/07/19 CC:  DO Jose Armando Hendrix MD

## 2019-05-07 NOTE — NURSE NAVIGATOR
Reviewed urine results with Dr. Nighat Casey. Called pt and left vm to notify that urinalysis was negative for infection.  
 
Man Abraham RN

## 2019-05-22 ENCOUNTER — TELEPHONE (OUTPATIENT)
Dept: CASE MANAGEMENT | Age: 64
End: 2019-05-22

## 2019-05-22 ENCOUNTER — HOSPITAL ENCOUNTER (OUTPATIENT)
Dept: RADIATION ONCOLOGY | Age: 64
Discharge: HOME | End: 2019-05-22
Payer: COMMERCIAL

## 2019-05-22 DIAGNOSIS — R30.0 DYSURIA: ICD-10-CM

## 2019-05-22 DIAGNOSIS — R30.0 DYSURIA: Primary | ICD-10-CM

## 2019-05-22 LAB
APPEARANCE UR: ABNORMAL
BACTERIA URNS QL MICRO: ABNORMAL /HPF
BILIRUB UR QL: NEGATIVE
CASTS URNS QL MICRO: 0 /LPF
COLOR UR: YELLOW
CRYSTALS URNS QL MICRO: 0 /LPF
EPI CELLS #/AREA URNS HPF: 0 /HPF
GLUCOSE UR STRIP.AUTO-MCNC: NEGATIVE MG/DL
HGB UR QL STRIP: ABNORMAL
KETONES UR QL STRIP.AUTO: NEGATIVE MG/DL
LEUKOCYTE ESTERASE UR QL STRIP.AUTO: ABNORMAL
MUCOUS THREADS URNS QL MICRO: 0 /LPF
NITRITE UR QL STRIP.AUTO: NEGATIVE
PH UR STRIP: 5.5 [PH] (ref 5–9)
PROT UR STRIP-MCNC: 30 MG/DL
RBC #/AREA URNS HPF: ABNORMAL /HPF
SP GR UR REFRACTOMETRY: 1.01 (ref 1–1.02)
UROBILINOGEN UR QL STRIP.AUTO: 0.2 EU/DL (ref 0.2–1)
WBC URNS QL MICRO: ABNORMAL /HPF

## 2019-05-22 PROCEDURE — 81003 URINALYSIS AUTO W/O SCOPE: CPT

## 2019-05-22 PROCEDURE — 87186 SC STD MICRODIL/AGAR DIL: CPT

## 2019-05-22 PROCEDURE — 87086 URINE CULTURE/COLONY COUNT: CPT

## 2019-05-22 PROCEDURE — 87088 URINE BACTERIA CULTURE: CPT

## 2019-05-22 PROCEDURE — 81015 MICROSCOPIC EXAM OF URINE: CPT

## 2019-05-22 RX ORDER — CIPROFLOXACIN 500 MG/1
500 TABLET ORAL 2 TIMES DAILY
Qty: 10 TAB | Refills: 0 | Status: SHIPPED | OUTPATIENT
Start: 2019-05-22 | End: 2019-05-30

## 2019-05-22 NOTE — TELEPHONE ENCOUNTER
UA today per Dr. Sagar Salazar. Positive results. Culture pending. Cipro escribed to pt's pharmacy. Will notify pt if sensitivity requires change in treatment. Pt allergic to Keflex. Pt notified by vm of above.      Tahir Faulkner RN

## 2019-05-24 ENCOUNTER — TELEPHONE (OUTPATIENT)
Dept: CASE MANAGEMENT | Age: 64
End: 2019-05-24

## 2019-05-24 LAB
BACTERIA SPEC CULT: ABNORMAL
SERVICE CMNT-IMP: ABNORMAL

## 2019-08-06 ENCOUNTER — HOSPITAL ENCOUNTER (OUTPATIENT)
Dept: RADIATION ONCOLOGY | Age: 64
Discharge: HOME | End: 2019-08-06
Payer: MEDICARE

## 2019-08-06 DIAGNOSIS — C61 PROSTATE CANCER (HCC): ICD-10-CM

## 2019-08-06 PROCEDURE — 84153 ASSAY OF PSA TOTAL: CPT

## 2019-08-06 PROCEDURE — 84403 ASSAY OF TOTAL TESTOSTERONE: CPT

## 2019-08-06 PROCEDURE — 36415 COLL VENOUS BLD VENIPUNCTURE: CPT

## 2019-08-07 LAB
PSA SERPL DL<=0.01 NG/ML-MCNC: 1.33 NG/ML (ref 0–4)
TESTOST SERPL-MCNC: 375 NG/DL (ref 264–916)

## 2019-08-08 ENCOUNTER — HOSPITAL ENCOUNTER (OUTPATIENT)
Dept: ULTRASOUND IMAGING | Age: 64
Discharge: HOME | End: 2019-08-08
Attending: FAMILY MEDICINE

## 2019-08-08 DIAGNOSIS — E78.00 PURE HYPERCHOLESTEROLEMIA: ICD-10-CM

## 2019-08-08 DIAGNOSIS — I10 ESSENTIAL HYPERTENSION WITH GOAL BLOOD PRESSURE LESS THAN 140/90: Chronic | ICD-10-CM

## 2019-08-13 ENCOUNTER — HOSPITAL ENCOUNTER (OUTPATIENT)
Dept: RADIATION ONCOLOGY | Age: 64
Discharge: HOME | End: 2019-08-13
Payer: MEDICARE

## 2019-08-13 VITALS
TEMPERATURE: 98.3 F | DIASTOLIC BLOOD PRESSURE: 74 MMHG | SYSTOLIC BLOOD PRESSURE: 125 MMHG | HEART RATE: 80 BPM | BODY MASS INDEX: 29.49 KG/M2 | OXYGEN SATURATION: 96 % | WEIGHT: 205.5 LBS | RESPIRATION RATE: 16 BRPM

## 2019-08-13 DIAGNOSIS — C61 PROSTATE CANCER (HCC): Primary | ICD-10-CM

## 2019-08-13 PROCEDURE — 99211 OFF/OP EST MAY X REQ PHY/QHP: CPT

## 2019-08-13 NOTE — PROGRESS NOTES
03/20/2019: SBRT [place] Prostate    08/06/2019: PSA: 1.330, Test: 375  05/01/2019: PSA: 2.790, Test: 341    Patient is currently taking Flomax    No recent images    AUA/Epic: 2  -Patient complains of frequency and nocturia but states he is pleased with the quality of life due to his urinary symptoms.       Jamaica Grigsby, CMA

## 2019-08-13 NOTE — PROGRESS NOTES
Patient: Alphonso Louise MRN: [id number]  SSN: [id number]    YOB: 1955  Age: 71 y.o. Sex: male      Other Providers:    MD Kyle Lyon DO    CHIEF COMPLAINT: Prostate cancer    DIAGNOSIS: Adenocarcinoma of the prostate, T2a, Ruth score 3+4 = 7, PSA 5.4    SITE TREATED AND DOSE DELIVERED: Mr. Lsahae Collazo received CyberKnife SBRT delivering 4000 cGy in 5 fractions of 800 cGy to the prostate using 6 MV photons prescribed to the 87.8% isodose line. The treatment required 57 beams and had an estimated treatment time of 33 minutes per fraction. Treatment was delivered from 03/11/19 through 03/20/19. HISTORY OF PRESENT ILLNESS:  Alphonso Louise is a 71 y.o. male who I am seeing at the request of Dr. Flora Good for discussion of manage options for this intermediate risk prostate cancer. He has a history of penile cancer s/p local excision. The patient has the following PSA history:  PSA 5. 4 July 2018  PSA 4.0 July 2017  PSA 3. 5 July 2016  PSA 2. 9 July 2015  PSA 2. 6 June 2014  Based on his PSA rise, he was sent to Dr. Flora Good for evaluation. Biopsy on 11/07/18 revealed Ruth score 3+4 = 7 adenocarcinoma in the right mid gland (3/3 cores, 90%, 75%, 50%) pattern 4 was 5%, PNI identified, and the right apex (<10%) pattern 4 was <5%. Prostate volume was 37.5 mL for a PSA density of 0.14. He discussed management options with Dr. Flora Good. He was then seen by Dr. David Varghese for discussion of RALP. He was felt to be a good surgical candidate. He is scheduled for an MRI of the pelvis on 01/03/19. He is here today for discussion of radiation based management further centimeters prostate cancer. MRI of the pelvis on 01/03/19 showed bilateral extensive peripheral zone signal abnormalities worrisome of tumor. Asymmetric prominent material was seen posterolaterally at the right apex which had PI-RADS 5 characteristics.  There was suspicion of GAUDENCIO at this area based on the abutment to the capsule. There was no evidence of seminal vesicle invasion or pelvic LAD. I presented his case at [place] [id number]. MRI was reviewed extensively. The radiologist felt that there no gross GAUDENCIO in the area of concern at the right apex. Treatment recommendations from the group included RALP versus CyberKnife SBRT. Other radiation-based options were also felt to be reasonable including use of HDR brachytherapy boost in conjunction with external beam radiation therapy with or without 6 months of ADT. He decided to move forward with CyberKnife SBRT. He also decided to include placement of SpaceOAR hydrogel for protection of the rectal wall along with placement of fiducial markers for image guidance. He underwent this procedure, 2/13/2019.     Mr. Maki then received CyberKnife SBRT delivering 4000 cGy in 5 fractions of 800 cGy to the prostate using 6 MV photons prescribed to the 87.8% isodose line. The treatment required 57 beams and had an estimated treatment time of 33 minutes per fraction. Treatment was delivered from 03/11/19 through 03/20/19. He tolerated treatment reasonably well, but beginning 1 week after treatment developed significant rectal discomfort and pain at the base of the scrotum. He has also noticed mucous with bowel movements and decreased caliber stool. He had episodes of tenesmus. He was constipated and tried MiraLax without significant improvement. He was taking ibuprofen 400 mg TID with some relief from discomfort. Also hot baths were helpful. He was diagnosed with a UTI and placed on a 7 day course of cephalexin 500 mg. After a few days on treatment he developed signs of allergy to the medication and was switched to Cipro 500 mg BID ending on 04/14/19. Over the past few weeks he has had soft stools up to x5-6 daily. He was taking probiotics without improvement. He contacted me on 05/04/19 with complaint of \"air bubbles escaping from my urethra when I urinate. \"  He first noticed this following his catheterization for CT simulation. He has since had 3 more episodes. There is no pain or foul odor associated. At his visit on 05/07/19, he continued to have some of these issues, but they were improving. The rectal symptoms including mucous, urgency and frequency were somewhat less. He had not had any recent passage of air bubbles from the urethra.             INTERVAL HISTORY: Mr. Cha Jackson returns for followup 6 weeks after completion of CyberKnife SBRT. On 07/01/19 he underwent cystoscopy under the direction of Dr. Lynne Lyon. 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 He was started on Flomax and continued on Cipro. Over the past few weeks he has had significant improvement in both  and GI symptoms. He has not had a UTI in the past 6 weeks and feels that his urinary function is back to baseline.      He has excellent performance status.  He denied significant urinary symptoms prior to treatment. Teena Blackburn had good bowel function prior to treatment. Teena Blackburn had good erectile function.      At this time has an AUA symptom score of 2/35 and is \"pleased\" with his urinary function. He has nocturia x0-1. He has occasional daytime frequency. He denies leakage. He has maintained baseline erectile function. PAST MEDICAL HISTORY:    Past Medical History:   Diagnosis Date    Agatston coronary artery calcium score between 100 and 199 2016    Calcium score of 665- f/u with Cardio    DVT (deep venous thrombosis) ([facility] [address] 11/14/2011 11/2011: LLE DVT    H/O complete eye exam 07/2019    Dr. Gretta Hoskins, [address] [place] [address] Hypercholesteremia     Hyperlipidemia 11/14/2011    Hypertension     Insomnia     Melanoma ([address] 07/2019    Left upper leg. Followed by Dermatology.      Prostate cancer ([facility] [address] Dx 11/2018    Cyber-knife radiation from Feb-March 2019    Recurrent UTI     s/p radiation for prostate ca    Squamous cell carcinoma     on the penis         PAST SURGICAL HISTORY: Past Surgical History:   Procedure Laterality Date    HX COLONOSCOPY  2016    f/u 5 years    HX ENDOSCOPY  2/2011    HX MALIGNANT SKIN LESION EXCISION  07/2019    Melanoma removed from left upper leg.  HX ORTHOPAEDIC Bilateral 2011    Achilles Tendon Repair    HX OTHER SURGICAL  2010    Squamous cell ca removed from penis    HX UROLOGICAL  02/13/2019    Fiducial Marker and placement of space oar gel (for treatment of prostate ca)    REPAIR/GRAFT ACHILLES TENDON      right 1987; left 10/2011       MEDICATIONS:     Current Outpatient Medications:     tamsulosin (FLOMAX) 0.4 mg capsule, Take 0.4 mg by mouth daily. , Disp: , Rfl:     [START ON 9/7/2019] LORazepam (ATIVAN) 1 mg tablet, 1/2-1 po qhs prn sleep, Disp: 30 Tab, Rfl: 3    amLODIPine-benazepril (LOTREL) 5-10 mg per capsule, Take 1 Cap by mouth nightly., Disp: 90 Cap, Rfl: 3    atorvastatin (LIPITOR) 80 mg tablet, Take 1 Tab by mouth daily. , Disp: 90 Tab, Rfl: 3    aspirin delayed-release 81 mg tablet, Take  by mouth daily. , Disp: , Rfl:     B.infantis-B.ani-B.long-B.bifi (PROBIOTIC 4X) 10-15 mg TbEC, Take 1 Tab by mouth daily. , Disp: , Rfl:     cholecalciferol (VITAMIN D3) 1,000 unit cap, Take  by mouth daily. , Disp: , Rfl:     FLAXSEED OIL PO, Take 1 Tab by mouth daily. , Disp: , Rfl:     CYANOCOBALAMIN, VITAMIN B-12, (VITAMIN B-12 PO), Take 1,000 mcg by mouth every morning., Disp: , Rfl:     OMEGA-3 FATTY ACIDS (FISH OIL CONCENTRATE PO), Take 1 Cap by mouth every morning. Hold until after surgery , Disp: , Rfl:     vitamin E (AQUA GEMS) 400 unit capsule, Take 400 Units by mouth every morning. Hold until after surgery , Disp: , Rfl:     ascorbic acid (VITAMIN C) 500 mg tablet, Take 500 mg by mouth every morning., Disp: , Rfl:     ALLERGIES:   Allergies   Allergen Reactions    Keflex [Cephalexin] Itching     Palms/feet. Diarrhea.        SOCIAL HISTORY:   Social History     Socioeconomic History    Marital status:      Spouse name: Not on file    Number of children: Not on file    Years of education: Not on file    Highest education level: Not on file   Occupational History    Not on file   Social Needs    Financial resource strain: Not on file    Food insecurity:     Worry: Not on file     Inability: Not on file    Transportation needs:     Medical: Not on file     Non-medical: Not on file   Tobacco Use    Smoking status: Former Smoker     Packs/day: 0.25     Years: 5.00     Pack years: 1.25    Smokeless tobacco: Never Used    Tobacco comment: quit in 1980's   Substance and Sexual Activity    Alcohol use: Yes     Alcohol/week: 3.0 - 4.0 standard drinks     Types: 3 - 4 Glasses of wine per week    Drug use: No     Types: Prescription    Sexual activity: Not on file   Lifestyle    Physical activity:     Days per week: Not on file     Minutes per session: Not on file    Stress: Not on file   Relationships    Social connections:     Talks on phone: Not on file     Gets together: Not on file     Attends Christianity service: Not on file     Active member of club or organization: Not on file     Attends meetings of clubs or organizations: Not on file     Relationship status: Not on file    Intimate partner violence:     Fear of current or ex partner: Not on file     Emotionally abused: Not on file     Physically abused: Not on file     Forced sexual activity: Not on file   Other Topics Concern    Not on file   Social History Narrative    Not on file       FAMILY HISTORY:   Family History   Problem Relation Age of Onset    Hypertension Mother     Alzheimer Mother     Hypertension Father     Heart Disease Father     Hypertension Sister         controlled with diet    Cancer Sister         melanoma       REVIEW OF SYSTEMS: Please see the completed review of systems sheet in the chart that I have reviewed today. PHYSICAL EXAMINATION:   ECOG Performance status 0  VITAL SIGNS:   There were no vitals taken for this visit. GENERAL: The patient is well-developed, ambulatory, alert and in no acute distress. PATHOLOGY:    11/07/18:      DIAGNOSIS   A: \"PROSTATE, LEFT BASE, BIOPSY\": PROSTATE TISSUE, NO CARCINOMA IDENTIFIED. B: \"PROSTATE, LEFT MID, BIOPSY\": PROSTATE TISSUE, NO CARCINOMA IDENTIFIED. C: \"PROSTATE, LEFT APEX, BIOPSY\": PROSTATE TISSUE, NO CARCINOMA IDENTIFIED. D: \"PROSTATE, RIGHT BASE, BIOPSY\": PROSTATE TISSUE, NO CARCINOMA IDENTIFIED. E: \"PROSTATE, RIGHT MID, BIOPSY\": PROSTATIC ADENOCARCINOMA, RUTH SCORE   3 + 4 = 7, INVOLVING 3 OF 3 CORES (50%, 75%, 90%). PERINEURAL INVASION PRESENT. GRADE GROUP: 2   PERCENTAGE GRADE 4: 5%   F: \"PROSTATE, RIGHT APEX, BIOPSY\": PROSTATIC ADENOCARCINOMA, RUTH SCORE 3 + 4 = 7, INVOLVING 10% OF FRAGMENTED BIOPSY. GRADE GROUP: 2   PERCENTAGE GRADE 4: LESS THAN 5%       LABORATORY:     05/01/19: PSA 2.790, testosterone 341.  08/02/19: PSA 1.330, testosterone 375      RADIOLOGY:      01/03/19: MRI Prostate without and with Contrast 1/3/2019 12:05 PM     Indication: Prostate cancer staging, recent biopsy done 11/7/2018 showed Ruth  score 7 disease in the right mid gland. PSA July 2018 5. 4.     Comparison: None available at this hospital PACS system     Technique:  Multiplanar T2, diffusion, pre and post contrast T1 and multi-phase  dynamic post contrast fat-suppressed T1 sequences. 18 mL  Dotarem iv contrast  given.     Findings:  Prostate size: Gland measures 5.0 cm craniocaudal, 5.8 cm transverse and 3.7 cm  AP. Finding is estimated at 56 g.     Peripheral Zone: There is good preservation of peripheral zone tissue. Bilaterally from the base to the apex T2 and ADC imaging shows heterogeneous  signal. Posterior laterally at the mid gland on both the left and the right  regions are somewhat prominent but most prominent abnormality is posterior  laterally at the right apex.  There is a strongly T2 and ADC hypointense focus on  ADC measuring up to 18 mm which is diffusion hyperintense and shows strong  postcontrast enhancement-Pirads 5. In this region as well the material and  especially enhancement shows asymmetric prominence posterior laterally and this  is very suspicious for an element of extracapsular extension. Bilaterally in the  peripheral zone there are extensive regions of significant enhancement.     Central Zone: Not enlarged, Little BPH changes.     Prostate Capsule: Away from the right apex prostate capsule appears intact.      Neurovascular Bundles: The abnormal material at the right apex region does come  in proximity to the neurovascular bundle.     Seminal Vesicles: Symmetric in size, normal signal with no abnormal enhancement.     Lymph Nodes: No enlarged enhancing pelvic adenopathy seen.     Bones: No aggressive hyperenhancing bone lesion seen.     Extra- Prostate Findings: Bladder midline, incompletely distended with no focal  abnormality. No discrete lesion seen at the rectosigmoid colon and the pelvic  bowel loops are unremarkable. No vascular lesion seen.     IMPRESSION  IMPRESSION:  1. Bilateral extensive prostate peripheral zone signal abnormalities worrisome  for tumor. There is asymmetric prominent material posterior laterally at the  right apex which has Pirads 5 characteristics. The findings in this region very  suspicious for extracapsular extension as well. Some of this material does come  in proximity to the position of the neurovascular bundle at the apex. Elsewhere  changes are well evident bilaterally at the mid gland as well. 2. I do not see evidence for metastatic lymphadenopathy or metastatic bone  disease on this exam.      IMPRESSION:  Lynn Cabrera is a 71 y.o. male with Adenocarcinoma of the prostate, T2a, Alpha score 3+4 = 7, PSA 5.4. He was treated with CyberKnife SBRT completed on 03/20/19. COUNSELING AND COORDINATION OF CARE: I have had a 40 min.  Follow-up with Mr. Kristan Madrid, greater than half of which has been spent counseling him about continued management of this favorable intermediate risk prostate cancer and the side effects associated with treatment. UA on 08/06/19 showed no evidence of infection. He will continue Flomax. He is essentially at baseline with regard to urinary and bowel function. He has had an excellent initial PSA response to radiation therapy. He will return for follow-up in 3 months with PSA and testosterone checked prior to his visit. He will continue to follow with Dr. Socorro Sherman. I appreciate the opportunity to participate in Mr. Briggs Knock care. Chadwick Reyna MD   August 13, 2019      Portions of this note were copied from prior encounters and reviewed for accuracy, currency, and represent documentation and tasks completed during this encounter. I verify and attest these portions to be unchanged from prior visits.     Chadwick Reyna MD  08/13/19      CC:  Rosales Richardson MD

## 2019-11-12 ENCOUNTER — HOSPITAL ENCOUNTER (OUTPATIENT)
Dept: RADIATION ONCOLOGY | Age: 64
Discharge: HOME | End: 2019-11-12
Payer: MEDICARE

## 2019-11-12 DIAGNOSIS — C61 PROSTATE CANCER (HCC): ICD-10-CM

## 2019-11-12 PROCEDURE — 36415 COLL VENOUS BLD VENIPUNCTURE: CPT

## 2019-11-12 PROCEDURE — 84153 ASSAY OF PSA TOTAL: CPT

## 2019-11-12 PROCEDURE — 84403 ASSAY OF TOTAL TESTOSTERONE: CPT

## 2019-11-13 LAB
PSA SERPL DL<=0.01 NG/ML-MCNC: 1.08 NG/ML (ref 0–4)
TESTOST SERPL-MCNC: 413 NG/DL (ref 264–916)

## 2019-11-19 ENCOUNTER — HOSPITAL ENCOUNTER (OUTPATIENT)
Dept: RADIATION ONCOLOGY | Age: 64
Discharge: HOME | End: 2019-11-19
Payer: MEDICARE

## 2019-11-19 VITALS
WEIGHT: 207.5 LBS | BODY MASS INDEX: 29.77 KG/M2 | HEART RATE: 77 BPM | SYSTOLIC BLOOD PRESSURE: 109 MMHG | RESPIRATION RATE: 16 BRPM | DIASTOLIC BLOOD PRESSURE: 71 MMHG | TEMPERATURE: 97.5 F | OXYGEN SATURATION: 97 %

## 2019-11-19 DIAGNOSIS — C61 PROSTATE CANCER (HCC): Primary | ICD-10-CM

## 2019-11-19 PROCEDURE — 99211 OFF/OP EST MAY X REQ PHY/QHP: CPT

## 2019-11-19 NOTE — PROGRESS NOTES
Patient: Ze Berger MRN: [id number]  SSN: [id number]    YOB: 1955  Age: 71 y.o. Sex: male      Other Providers:    MD Hair Henao , DO    CHIEF COMPLAINT: Prostate cancer    DIAGNOSIS: Adenocarcinoma of the prostate, T2a, [place] score 3+4 = 7, PSA 5.4    SITE TREATED AND DOSE DELIVERED: Mr. Carmen Devries received CyberKnife SBRT delivering 4000 cGy in 5 fractions of 800 cGy to the prostate using 6 MV photons prescribed to the 87.8% isodose line. The treatment required 57 beams and had an estimated treatment time of 33 minutes per fraction. Treatment was delivered from 03/11/19 through 03/20/19. HISTORY OF PRESENT ILLNESS:  Ze Berger is a 71 y.o. male seen initially by Dr. Larue Krabbe at the request of Dr. Tiffany Torres for discussion of manage options for this intermediate risk prostate cancer. He has a history of penile cancer s/p local excision. The patient has the following PSA history:  PSA 5. 4 July 2018  PSA 4.0 July 2017  PSA 3. 5 July 2016  PSA 2. 9 July 2015  PSA 2. 6 June 2014  Based on his PSA rise, he was sent to Dr. Tiffany Torres for evaluation. Biopsy on 11/07/18 revealed [place] score 3+4 = 7 adenocarcinoma in the right mid gland (3/3 cores, 90%, 75%, 50%) pattern 4 was 5%, PNI identified, and the right apex (<10%) pattern 4 was <5%. Prostate volume was 37.5 mL for a PSA density of 0.14. He discussed management options with Dr. Tiffany Torres. He was then seen by Dr. Mishel Johnson for discussion of RALP. He was felt to be a good surgical candidate. He is scheduled for an MRI of the pelvis on 01/03/19. He is here today for discussion of radiation based management further centimeters prostate cancer. MRI of the pelvis on 01/03/19 showed bilateral extensive peripheral zone signal abnormalities worrisome of tumor. Asymmetric prominent material was seen posterolaterally at the right apex which had PI-RADS 5 characteristics.  There was suspicion of GAUDENCIO at this area based on the abutment to the capsule. There was no evidence of seminal vesicle invasion or pelvic LAD. Dr Isabel Torres presented his case at [place] [id number]. MRI was reviewed extensively. The radiologist felt that there no gross GAUDENCIO in the area of concern at the right apex. Treatment recommendations from the group included RALP versus CyberKnife SBRT. Other radiation-based options were also felt to be reasonable including use of HDR brachytherapy boost in conjunction with external beam radiation therapy with or without 6 months of ADT. He decided to move forward with CyberKnife SBRT. He also decided to include placement of SpaceOAR hydrogel for protection of the rectal wall along with placement of fiducial markers for image guidance. He underwent this procedure, 2/13/2019.     Mr. Maki then received CyberKnife SBRT delivering 4000 cGy in 5 fractions of 800 cGy to the prostate using 6 MV photons prescribed to the 87.8% isodose line. The treatment required 57 beams and had an estimated treatment time of 33 minutes per fraction. Treatment was delivered from 03/11/19 through 03/20/19. He tolerated treatment reasonably well, but beginning 1 week after treatment developed significant rectal discomfort and pain at the base of the scrotum. He has also noticed mucous with bowel movements and decreased caliber stool. He had episodes of tenesmus. He was constipated and tried MiraLax without significant improvement. He was taking ibuprofen 400 mg TID with some relief from discomfort. Also hot baths were helpful. He was diagnosed with a UTI and placed on a 7 day course of cephalexin 500 mg. After a few days on treatment he developed signs of allergy to the medication and was switched to Cipro 500 mg BID ending on 04/14/19. Over the past few weeks he has had soft stools up to x5-6 daily. He was taking probiotics without improvement.      He contacted Dr. Isabel Torres on 05/04/19 with complaint of \"air bubbles escaping from my urethra when I urinate. \"  He first noticed this following his catheterization for CT simulation. He has since had 3 more episodes. There is no pain or foul odor associated. At his visit on 05/07/19, he continued to have some of these issues, but they were improving. The rectal symptoms including mucous, urgency and frequency were somewhat less. He had not had any recent passage of air bubbles from the urethra.      INTERVAL HISTORY:   Mr. Sue Eugene returned for followup 6 weeks after completion of CyberKnife SBRT. On 07/01/19 he underwent cystoscopy under the direction of Dr. Mike Hunter. 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 He was started on Flomax and continued on Cipro. Over the past few weeks he has had significant improvement in both  and GI symptoms. He has not had a UTI in the past 6 weeks and feels that his urinary function is back to baseline.      He has excellent performance status.  He denied significant urinary symptoms prior to treatment. Ori Senior had good bowel function prior to treatment. Ori Senior had good erectile function.      Mr Sue Eugene returns 8 months after completion of radiation therapy. He states having a \"good 2 months\" after completing antibiotics in July for UTI. He states that approximately 6 weeks ago, the symptoms recurred. He reports having pain deep with in the rectum and testicles. The pain is \"constant\" but controlled with procto cream and advil. Denies bloody stools/urine. No urinary symptoms. At this time has an AUA symptom score of 1/35 and is \"pleased\" with his urinary function. He has nocturia x0-1. He has occasional daytime frequency. He denies leakage. He has maintained baseline erectile function. Has occasional bowel urgency, otherwise no complaints. He is not sexually active and reports this as a moderate problem. Energy back to his baseline.      PAST MEDICAL HISTORY:    Past Medical History:   Diagnosis Date    [place] coronary artery calcium score between 100 and 199 2016    Calcium score of 665- f/u with Cardio    DVT (deep venous thrombosis) ([facility] 11/14/2011 11/2011: LLE DVT    H/O complete eye exam 07/2019    Dr. Colleen Alfred, [facility] Hypercholesteremia     Hyperlipidemia 11/14/2011    Hypertension     Insomnia     Melanoma ([facility] 07/2019    Left upper leg. Followed by Dermatology.  Prostate cancer ([facility] [address] Dx 11/2018    Cyber-knife radiation from Feb-March 2019    Recurrent UTI     s/p radiation for prostate ca    Squamous cell carcinoma     on the penis         PAST SURGICAL HISTORY:   Past Surgical History:   Procedure Laterality Date    HX COLONOSCOPY  2016    f/u 5 years    HX ENDOSCOPY  2/2011    HX MALIGNANT SKIN LESION EXCISION  07/2019    Melanoma removed from left upper leg.  HX ORTHOPAEDIC Bilateral 2011    Achilles Tendon Repair    HX OTHER SURGICAL  2010    Squamous cell ca removed from penis    HX UROLOGICAL  02/13/2019    Fiducial Marker and placement of space oar gel (for treatment of prostate ca)    REPAIR/GRAFT ACHILLES TENDON      right 1987; left 10/2011       MEDICATIONS:     Current Outpatient Medications:     hydrocortisone (PROCTOCREAM-HC RE), Insert 2.5 % into rectum. Indications: Rectal pain post RT, use 2-4 times daily, 30 gm tube, 2RF, called to [facility] in [place], [place], Disp: , Rfl:     tamsulosin (FLOMAX) 0.4 mg capsule, Take 0.4 mg by mouth daily. , Disp: , Rfl:     LORazepam (ATIVAN) 1 mg tablet, 1/2-1 po qhs prn sleep, Disp: 30 Tab, Rfl: 3    amLODIPine-benazepril (LOTREL) 5-10 mg per capsule, Take 1 Cap by mouth nightly., Disp: 90 Cap, Rfl: 3    atorvastatin (LIPITOR) 80 mg tablet, Take 1 Tab by mouth daily. , Disp: 90 Tab, Rfl: 3    aspirin delayed-release 81 mg tablet, Take  by mouth daily. , Disp: , Rfl:     B.infantis-B.ani-B.long-B.bifi (PROBIOTIC 4X) 10-15 mg TbEC, Take 1 Tab by mouth daily. , Disp: , Rfl:     cholecalciferol (VITAMIN D3) 1,000 unit cap, Take  by mouth daily. , Disp: , Rfl:     FLAXSEED OIL PO, Take 1 Tab by mouth daily. , Disp: , Rfl:     CYANOCOBALAMIN, VITAMIN B-12, (VITAMIN B-12 PO), Take 1,000 mcg by mouth every morning., Disp: , Rfl:     OMEGA-3 FATTY ACIDS (FISH OIL CONCENTRATE PO), Take 1 Cap by mouth every morning. Hold until after surgery , Disp: , Rfl:     vitamin E (AQUA GEMS) 400 unit capsule, Take 400 Units by mouth every morning. Hold until after surgery , Disp: , Rfl:     ascorbic acid (VITAMIN C) 500 mg tablet, Take 500 mg by mouth every morning., Disp: , Rfl:     ALLERGIES:   Allergies   Allergen Reactions    Keflex [Cephalexin] Itching     Palms/feet. Diarrhea. SOCIAL HISTORY:   Social History     Socioeconomic History    Marital status:      Spouse name: Not on file    Number of children: Not on file    Years of education: Not on file    Highest education level: Not on file   Occupational History    Not on file   Social Needs    Financial resource strain: Not on file    Food insecurity:     Worry: Not on file     Inability: Not on file    Transportation needs:     Medical: Not on file     Non-medical: Not on file   Tobacco Use    Smoking status: Former Smoker     Packs/day: 0.25     Years: 5.00     Pack years: 1.25    Smokeless tobacco: Never Used    Tobacco comment: quit in 1980's   Substance and Sexual Activity    Alcohol use:  Yes     Alcohol/week: 3.0 - 4.0 standard drinks     Types: 3 - 4 Glasses of wine per week    Drug use: No     Types: Prescription    Sexual activity: Not on file   Lifestyle    Physical activity:     Days per week: Not on file     Minutes per session: Not on file    Stress: Not on file   Relationships    Social connections:     Talks on phone: Not on file     Gets together: Not on file     Attends [employer] service: Not on file     Active member of club or organization: Not on file     Attends meetings of clubs or organizations: Not on file     Relationship status: Not on file    Intimate partner violence:     Fear of current or ex partner: Not on file     Emotionally abused: Not on file     Physically abused: Not on file     Forced sexual activity: Not on file   Other Topics Concern    Not on file   Social History Narrative    Not on file       FAMILY HISTORY:   Family History   Problem Relation Age of Onset    Hypertension Mother     Alzheimer Mother     Hypertension Father     Heart Disease Father     Hypertension Sister         controlled with diet    Cancer Sister         melanoma       REVIEW OF SYSTEMS: Please see interval history    PHYSICAL EXAMINATION:   ECOG Performance status 0  VITAL SIGNS:  Blood pressure 109/71  Pulse 77 temperature 97.5  Weight 207.5     GENERAL: The patient is well-developed, ambulatory, alert and in no acute distress. PATHOLOGY:    11/07/18:      DIAGNOSIS   A: \"PROSTATE, LEFT BASE, BIOPSY\": PROSTATE TISSUE, NO CARCINOMA IDENTIFIED. B: \"PROSTATE, LEFT MID, BIOPSY\": PROSTATE TISSUE, NO CARCINOMA IDENTIFIED. C: \"PROSTATE, LEFT APEX, BIOPSY\": PROSTATE TISSUE, NO CARCINOMA IDENTIFIED. D: \"PROSTATE, RIGHT BASE, BIOPSY\": PROSTATE TISSUE, NO CARCINOMA IDENTIFIED. E: \"PROSTATE, RIGHT MID, BIOPSY\": PROSTATIC ADENOCARCINOMA, CATHY SCORE   3 + 4 = 7, INVOLVING 3 OF 3 CORES (50%, 75%, 90%). PERINEURAL INVASION PRESENT. GRADE GROUP: 2   PERCENTAGE GRADE 4: 5%   F: \"PROSTATE, RIGHT APEX, BIOPSY\": PROSTATIC ADENOCARCINOMA, CATHY SCORE 3 + 4 = 7, INVOLVING 10% OF FRAGMENTED BIOPSY. GRADE GROUP: 2   PERCENTAGE GRADE 4: LESS THAN 5%       LABORATORY:   11/12/19: PSA 1.080, testosterone 413  05/01/19: PSA 2.790, testosterone 341  08/02/19: PSA 1.330, testosterone 375    RADIOLOGY:      01/03/19: MRI Prostate without and with Contrast 1/3/2019 12:05 PM     Indication: Prostate cancer staging, recent biopsy done 11/7/2018 showed [place]  score 7 disease in the right mid gland. PSA July 2018 5. 4.     Comparison: None available at this hospital PACS system     Technique:  Multiplanar T2, diffusion, pre and post contrast T1 and multi-phase  dynamic post contrast fat-suppressed T1 sequences. 18 mL  Dotarem iv contrast  given.     Findings:  Prostate size: Gland measures 5.0 cm craniocaudal, 5.8 cm transverse and 3.7 cm  AP. Finding is estimated at 56 g.     Peripheral Zone: There is good preservation of peripheral zone tissue. Bilaterally from the base to the apex T2 and ADC imaging shows heterogeneous  signal. Posterior laterally at the mid gland on both the left and the right  regions are somewhat prominent but most prominent abnormality is posterior  laterally at the right apex. There is a strongly T2 and ADC hypointense focus on  ADC measuring up to 18 mm which is diffusion hyperintense and shows strong  postcontrast enhancement-Pirads 5. In this region as well the material and  especially enhancement shows asymmetric prominence posterior laterally and this  is very suspicious for an element of extracapsular extension. Bilaterally in the  peripheral zone there are extensive regions of significant enhancement.     Central Zone: Not enlarged, Little BPH changes.     Prostate Capsule: Away from the right apex prostate capsule appears intact.      Neurovascular Bundles: The abnormal material at the right apex region does come  in proximity to the neurovascular bundle.     Seminal Vesicles: Symmetric in size, normal signal with no abnormal enhancement.     Lymph Nodes: No enlarged enhancing pelvic adenopathy seen.     Bones: No aggressive hyperenhancing bone lesion seen.     Extra- Prostate Findings: Bladder midline, incompletely distended with no focal  abnormality. No discrete lesion seen at the rectosigmoid colon and the pelvic  bowel loops are unremarkable. No vascular lesion seen.     IMPRESSION:  1. Bilateral extensive prostate peripheral zone signal abnormalities worrisome  for tumor.  There is asymmetric prominent material posterior laterally at the  right apex which has Pirads 5 characteristics. The findings in this region very  suspicious for extracapsular extension as well. Some of this material does come  in proximity to the position of the neurovascular bundle at the apex. Elsewhere  changes are well evident bilaterally at the mid gland as well. 2. I do not see evidence for metastatic lymphadenopathy or metastatic bone  disease on this exam.      IMPRESSION:  Alma Calvillo is a 71 y.o. male with Adenocarcinoma of the prostate, T2a, [place] score 3+4 = 7, PSA 5.4. He was treated with CyberKnife SBRT completed on 03/20/19. COUNSELING AND COORDINATION OF CARE: I have had a 20 minute follow-up with Mr. Farrah Mooney, greater than half of which has been spent counseling him about continued management of this favorable intermediate risk prostate cancer and the side effects associated with treatment. He will continue Flomax. Urinary/rectal symptoms restarted approximately 6 weeks ago. Recommend follow up with Dr. Chelo Portillo to rule out infection such as epidymidis. Will assist facilitating his visit with Dr. Chelo Portillo, Urology. Continue procto cream and advil for rectal discomfort. If symptoms persist, will refer to GI. He continues to have an excellent initial PSA response to radiation therapy. He will return for follow-up in 3 months with PSA and testosterone checked prior to his visit. He will follow with Dr. Chelo Portillo for urinary symptoms. I appreciate the opportunity to participate in Mr. Candice cabrales. Rose Marie Li NP   November 19, 2019      Portions of this note were copied from prior encounters and reviewed for accuracy, currency, and represent documentation and tasks completed during this encounter. I verify and attest these portions to be unchanged from prior visits.         CC:  Jackie Montenegro MD

## 2019-11-19 NOTE — PROGRESS NOTES
SBRT 3/2019    LABS:   11/12/2019: PSA: 1.080, Test: 413  08/06/2019 - PSA: 1.330, Test: 375    10/18/2019 - Patient called Rad Onc c/o rectal pain, Dr. Vimal Self ordered proctocream 2.5%. -Patient is still having rectal pain, 4/10 on a pain scale. He states he is still using the cream about 2 times daily and only has 1 tube left.     AUA/Epic: 1, Pleased        Stefano Andrews, CMA

## 2020-02-11 ENCOUNTER — TELEPHONE (OUTPATIENT)
Dept: CASE MANAGEMENT | Age: 65
End: 2020-02-11

## 2020-02-11 ENCOUNTER — HOSPITAL ENCOUNTER (OUTPATIENT)
Dept: RADIATION ONCOLOGY | Age: 65
Discharge: HOME | End: 2020-02-11
Payer: MEDICARE

## 2020-02-11 DIAGNOSIS — C61 PROSTATE CANCER (HCC): ICD-10-CM

## 2020-02-11 DIAGNOSIS — R30.0 DYSURIA: Primary | ICD-10-CM

## 2020-02-11 DIAGNOSIS — R30.0 DYSURIA: ICD-10-CM

## 2020-02-11 LAB
APPEARANCE UR: CLEAR
BACTERIA URNS QL MICRO: 0 /HPF
BILIRUB UR QL: NEGATIVE
CASTS URNS QL MICRO: 0 /LPF
COLOR UR: YELLOW
CRYSTALS URNS QL MICRO: 0 /LPF
EPI CELLS #/AREA URNS HPF: 0 /HPF
GLUCOSE UR STRIP.AUTO-MCNC: NEGATIVE MG/DL
HGB UR QL STRIP: NEGATIVE
KETONES UR QL STRIP.AUTO: NEGATIVE MG/DL
LEUKOCYTE ESTERASE UR QL STRIP.AUTO: NEGATIVE
MUCOUS THREADS URNS QL MICRO: 0 /LPF
NITRITE UR QL STRIP.AUTO: NEGATIVE
PH UR STRIP: 5 [PH] (ref 5–9)
PROT UR STRIP-MCNC: NEGATIVE MG/DL
PSA SERPL-MCNC: 0.9 NG/ML
RBC #/AREA URNS HPF: 0 /HPF
SP GR UR REFRACTOMETRY: 1.01 (ref 1–1.02)
UA: UC IF INDICATED,UAUC: NORMAL
UROBILINOGEN UR QL STRIP.AUTO: 0.2 EU/DL (ref 0.2–1)
WBC URNS QL MICRO: 0 /HPF

## 2020-02-11 PROCEDURE — 84403 ASSAY OF TOTAL TESTOSTERONE: CPT

## 2020-02-11 PROCEDURE — 81001 URINALYSIS AUTO W/SCOPE: CPT

## 2020-02-11 PROCEDURE — 84153 ASSAY OF PSA TOTAL: CPT

## 2020-02-11 PROCEDURE — 36415 COLL VENOUS BLD VENIPUNCTURE: CPT

## 2020-02-12 LAB — TESTOST SERPL-MCNC: 370 NG/DL (ref 264–916)

## 2020-02-18 ENCOUNTER — HOSPITAL ENCOUNTER (OUTPATIENT)
Dept: RADIATION ONCOLOGY | Age: 65
Discharge: HOME | End: 2020-02-18
Payer: MEDICARE

## 2020-02-18 VITALS
DIASTOLIC BLOOD PRESSURE: 75 MMHG | HEART RATE: 74 BPM | WEIGHT: 214.7 LBS | BODY MASS INDEX: 30.81 KG/M2 | SYSTOLIC BLOOD PRESSURE: 126 MMHG | TEMPERATURE: 98.2 F | OXYGEN SATURATION: 97 % | RESPIRATION RATE: 16 BRPM

## 2020-02-18 PROCEDURE — 99211 OFF/OP EST MAY X REQ PHY/QHP: CPT

## 2020-02-18 NOTE — PROGRESS NOTES
Patient: Gentry Aguirre MRN: [id number]  SSN: [id number]    YOB: 1955  Age: 71 y.o. Sex: male      Other Providers:    MD Delilah Guerrero DO    CHIEF COMPLAINT: Prostate cancer    DIAGNOSIS: Adenocarcinoma of the prostate, T2a, Ruth score 3+4 = 7, PSA 5.4    SITE TREATED AND DOSE DELIVERED: Mr. Kelly Shone received CyberKnife SBRT delivering 4000 cGy in 5 fractions of 800 cGy to the prostate using 6 MV photons prescribed to the 87.8% isodose line. The treatment required 57 beams and had an estimated treatment time of 33 minutes per fraction. Treatment was delivered from 03/11/19 through 03/20/19. HISTORY OF PRESENT ILLNESS:  Gentry Aguirre is a 71 y.o. male seen initially by Dr. Adelaida Saab at the request of Dr. Mario Maldonado for discussion of manage options for this intermediate risk prostate cancer. He has a history of penile cancer s/p local excision. The patient has the following PSA history:  PSA 5. 4 July 2018  PSA 4.0 July 2017  PSA 3. 5 July 2016  PSA 2. 9 July 2015  PSA 2. 6 June 2014  Based on his PSA rise, he was sent to Dr. Mario Maldonado for evaluation. Biopsy on 11/07/18 revealed [place] score 3+4 = 7 adenocarcinoma in the right mid gland (3/3 cores, 90%, 75%, 50%) pattern 4 was 5%, PNI identified, and the right apex (<10%) pattern 4 was <5%. Prostate volume was 37.5 mL for a PSA density of 0.14. He discussed management options with Dr. Mario Maldonado. He was then seen by Dr. Dominick Barry for discussion of RALP. He was felt to be a good surgical candidate. He is scheduled for an MRI of the pelvis on 01/03/19. He is here today for discussion of radiation based management further centimeters prostate cancer. MRI of the pelvis on 01/03/19 showed bilateral extensive peripheral zone signal abnormalities worrisome of tumor. Asymmetric prominent material was seen posterolaterally at the right apex which had PI-RADS 5 characteristics.  There was suspicion of GAUDENCIO at this area based on the abutment to the capsule. There was no evidence of seminal vesicle invasion or pelvic LAD. Dr Faith Carballo presented his case at [place] [id number]. MRI was reviewed extensively. The radiologist felt that there no gross GAUDENCIO in the area of concern at the right apex. Treatment recommendations from the group included RALP versus CyberKnife SBRT. Other radiation-based options were also felt to be reasonable including use of HDR brachytherapy boost in conjunction with external beam radiation therapy with or without 6 months of ADT. He decided to move forward with CyberKnife SBRT. He also decided to include placement of SpaceOAR hydrogel for protection of the rectal wall along with placement of fiducial markers for image guidance. He underwent this procedure, 2/13/2019.     Mr. Maki then received CyberKnife SBRT delivering 4000 cGy in 5 fractions of 800 cGy to the prostate using 6 MV photons prescribed to the 87.8% isodose line. The treatment required 57 beams and had an estimated treatment time of 33 minutes per fraction. Treatment was delivered from 03/11/19 through 03/20/19. He tolerated treatment reasonably well, but beginning 1 week after treatment developed significant rectal discomfort and pain at the base of the scrotum. He has also noticed mucous with bowel movements and decreased caliber stool. He had episodes of tenesmus. He was constipated and tried MiraLax without significant improvement. He was taking ibuprofen 400 mg TID with some relief from discomfort. Also hot baths were helpful. He was diagnosed with a UTI and placed on a 7 day course of cephalexin 500 mg. After a few days on treatment he developed signs of allergy to the medication and was switched to Cipro 500 mg BID ending on 04/14/19. Over the past few weeks he has had soft stools up to x5-6 daily. He was taking probiotics without improvement.      He contacted Dr. Faith Carballo on 05/04/19 with complaint of \"air bubbles escaping from my urethra when I urinate. \"  He first noticed this following his catheterization for CT simulation. He has since had 3 more episodes. There is no pain or foul odor associated. At his visit on 05/07/19, he continued to have some of these issues, but they were improving. The rectal symptoms including mucous, urgency and frequency were somewhat less. He had not had any recent passage of air bubbles from the urethra.      INTERVAL HISTORY:   Mr. Gavin Singh returned for followup 6 weeks after completion of CyberKnife SBRT. On 07/01/19 he underwent cystoscopy under the direction of Dr. Lauren Ramirez. 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 He was started on Flomax and continued on Cipro. Over the past few weeks he has had significant improvement in both  and GI symptoms. He has not had a UTI in the past 6 weeks and feels that his urinary function is back to baseline.      He has excellent performance status.  He denied significant urinary symptoms prior to treatment. [facility] had good bowel function prior to treatment. [facility] had good erectile function.      Mr Gavin Singh returns 11 months after completing radiation therapy. At this time,Mr Gavin Singh is doing well. He reports no urinary changes. He has an AUA of 1/35 and is \"pleased\" with his urinary symptoms. Has normal bowel function. His biggest complaint is right low/flank pain. Recent urinalysis was negative. He was started on antiinflammatory and muscle relaxer by his primary care physician and has noticed some response. PAST MEDICAL HISTORY:    Past Medical History:   Diagnosis Date    Agatston coronary artery calcium score between 100 and 199 2016    Calcium score of 665- f/u with Cardio    DVT (deep venous thrombosis) ([facility] [address] 11/14/2011 11/2011: LLE DVT    H/O complete eye exam 07/2019    Dr. Dasha Milan, [facility] Hypercholesteremia     Hyperlipidemia 11/14/2011    Hypertension     Insomnia     Melanoma ([facility] [address] 07/2019    Left upper leg.  Followed by Dermatology.  Prostate cancer ([facility] [address] Dx 11/2018    Cyber-knife radiation from Feb-March 2019    Recurrent UTI     s/p radiation for prostate ca    Squamous cell carcinoma     on the penis         PAST SURGICAL HISTORY:   Past Surgical History:   Procedure Laterality Date    HX COLONOSCOPY  2016    f/u 5 years    HX ENDOSCOPY  2/2011    HX MALIGNANT SKIN LESION EXCISION  07/2019    Melanoma removed from left upper leg.  HX ORTHOPAEDIC Bilateral 2011    Achilles Tendon Repair    HX OTHER SURGICAL  2010    Squamous cell ca removed from penis    HX UROLOGICAL  02/13/2019    Fiducial Marker and placement of space oar gel (for treatment of prostate ca)    MA REPAIR/GRAFT ACHILLES TENDON      right 1987; left 10/2011       MEDICATIONS:     Current Outpatient Medications:     [START ON 2/24/2020] LORazepam (ATIVAN) 1 mg tablet, 1/2-1 po qhs prn sleep, Disp: 30 Tab, Rfl: 1    cyclobenzaprine (FLEXERIL) 10 mg tablet, Take 1 Tab by mouth nightly as needed for Muscle Spasm(s). , Disp: 10 Tab, Rfl: 0    meloxicam (MOBIC) 15 mg tablet, Take 1/2 to 1 tab by mouth daily as needed, Disp: 20 Tab, Rfl: 0    OTHER, Apply 1 Tube to affected area two (2) times a day. Indications: ProctoCream 2.5%, Disp: , Rfl:     hydrocortisone (PROCTOCREAM-HC RE), Insert 2.5 % into rectum. Indications: Rectal pain post RT, use 2-4 times daily, 30 gm tube, 2RF, called to [facility] in [place], [place], Disp: , Rfl:     tamsulosin (FLOMAX) 0.4 mg capsule, Take 0.4 mg by mouth daily. , Disp: , Rfl:     amLODIPine-benazepril (LOTREL) 5-10 mg per capsule, Take 1 Cap by mouth nightly., Disp: 90 Cap, Rfl: 3    atorvastatin (LIPITOR) 80 mg tablet, Take 1 Tab by mouth daily. , Disp: 90 Tab, Rfl: 3    aspirin delayed-release 81 mg tablet, Take  by mouth daily. , Disp: , Rfl:     B.infantis-B.ani-B.long-B.bifi (PROBIOTIC 4X) 10-15 mg TbEC, Take 1 Tab by mouth daily. , Disp: , Rfl:     cholecalciferol (VITAMIN D3) 1,000 unit cap, Take  by mouth daily. , Disp: , Rfl:     FLAXSEED OIL PO, Take 1 Tab by mouth daily. , Disp: , Rfl:     CYANOCOBALAMIN, VITAMIN B-12, (VITAMIN B-12 PO), Take 1,000 mcg by mouth every morning., Disp: , Rfl:     OMEGA-3 FATTY ACIDS (FISH OIL CONCENTRATE PO), Take 1 Cap by mouth every morning. Hold until after surgery , Disp: , Rfl:     vitamin E (AQUA GEMS) 400 unit capsule, Take 400 Units by mouth every morning. Hold until after surgery , Disp: , Rfl:     ascorbic acid (VITAMIN C) 500 mg tablet, Take 500 mg by mouth every morning., Disp: , Rfl:     ALLERGIES:   Allergies   Allergen Reactions    Keflex [Cephalexin] Itching     Palms/feet. Diarrhea. SOCIAL HISTORY:   Social History     Socioeconomic History    Marital status:      Spouse name: Not on file    Number of children: Not on file    Years of education: Not on file    Highest education level: Not on file   Occupational History    Not on file   Social Needs    Financial resource strain: Not on file    Food insecurity:     Worry: Not on file     Inability: Not on file    Transportation needs:     Medical: Not on file     Non-medical: Not on file   Tobacco Use    Smoking status: Former Smoker     Packs/day: 0.25     Years: 5.00     Pack years: 1.25    Smokeless tobacco: Never Used    Tobacco comment: quit in 1980's   Substance and Sexual Activity    Alcohol use:  Yes     Alcohol/week: 3.0 - 4.0 standard drinks     Types: 3 - 4 Glasses of wine per week    Drug use: No     Types: Prescription    Sexual activity: Not on file   Lifestyle    Physical activity:     Days per week: Not on file     Minutes per session: Not on file    Stress: Not on file   Relationships    Social connections:     Talks on phone: Not on file     Gets together: Not on file     Attends Protestant service: Not on file     Active member of club or organization: Not on file     Attends meetings of clubs or organizations: Not on file     Relationship status: Not on file    Intimate partner violence:     Fear of current or ex partner: Not on file     Emotionally abused: Not on file     Physically abused: Not on file     Forced sexual activity: Not on file   Other Topics Concern    Not on file   Social History Narrative    Not on file       FAMILY HISTORY:   Family History   Problem Relation Age of Onset    Hypertension Mother     Alzheimer Mother     Hypertension Father     Heart Disease Father     Hypertension Sister         controlled with diet    Cancer Sister         melanoma       REVIEW OF SYSTEMS: Please see interval history    PHYSICAL EXAMINATION:   ECOG Performance status 0  VITAL SIGNS:  Blood pressure 126/75 Pulse 74  Temperature 98.2  Weight 214.7     GENERAL: The patient is well-developed, ambulatory, alert and in no acute distress. PATHOLOGY:    11/07/18:      DIAGNOSIS   A: \"PROSTATE, LEFT BASE, BIOPSY\": PROSTATE TISSUE, NO CARCINOMA IDENTIFIED. B: \"PROSTATE, LEFT MID, BIOPSY\": PROSTATE TISSUE, NO CARCINOMA IDENTIFIED. C: \"PROSTATE, LEFT APEX, BIOPSY\": PROSTATE TISSUE, NO CARCINOMA IDENTIFIED. D: \"PROSTATE, RIGHT BASE, BIOPSY\": PROSTATE TISSUE, NO CARCINOMA IDENTIFIED. E: \"PROSTATE, RIGHT MID, BIOPSY\": PROSTATIC ADENOCARCINOMA, CATHY SCORE   3 + 4 = 7, INVOLVING 3 OF 3 CORES (50%, 75%, 90%). PERINEURAL INVASION PRESENT. GRADE GROUP: 2   PERCENTAGE GRADE 4: 5%   F: \"PROSTATE, RIGHT APEX, BIOPSY\": PROSTATIC ADENOCARCINOMA, CATHY SCORE 3 + 4 = 7, INVOLVING 10% OF FRAGMENTED BIOPSY. GRADE GROUP: 2   PERCENTAGE GRADE 4: LESS THAN 5%     LABORATORY:   02/11/20: PSA 0.9, testosterone 370  11/12/19: PSA 1.080, testosterone 413  05/01/19: PSA 2.790, testosterone 341  08/02/19: PSA 1.330, testosterone 375    RADIOLOGY:      01/03/19: MRI Prostate without and with Contrast 1/3/2019 12:05 PM     Indication: Prostate cancer staging, recent biopsy done 11/7/2018 showed [place]  score 7 disease in the right mid gland. PSA July 2018 5. 4.     Comparison: None available at this hospital PACS system     Technique:  Multiplanar T2, diffusion, pre and post contrast T1 and multi-phase  dynamic post contrast fat-suppressed T1 sequences. 18 mL  Dotarem iv contrast  given.     Findings:  Prostate size: Gland measures 5.0 cm craniocaudal, 5.8 cm transverse and 3.7 cm  AP. Finding is estimated at 56 g.     Peripheral Zone: There is good preservation of peripheral zone tissue. Bilaterally from the base to the apex T2 and ADC imaging shows heterogeneous  signal. Posterior laterally at the mid gland on both the left and the right  regions are somewhat prominent but most prominent abnormality is posterior  laterally at the right apex. There is a strongly T2 and ADC hypointense focus on  ADC measuring up to 18 mm which is diffusion hyperintense and shows strong  postcontrast enhancement-Pirads 5. In this region as well the material and  especially enhancement shows asymmetric prominence posterior laterally and this  is very suspicious for an element of extracapsular extension. Bilaterally in the  peripheral zone there are extensive regions of significant enhancement.     Central Zone: Not enlarged, Little BPH changes.     Prostate Capsule: Away from the right apex prostate capsule appears intact.      Neurovascular Bundles: The abnormal material at the right apex region does come  in proximity to the neurovascular bundle.     Seminal Vesicles: Symmetric in size, normal signal with no abnormal enhancement.     Lymph Nodes: No enlarged enhancing pelvic adenopathy seen.     Bones: No aggressive hyperenhancing bone lesion seen.     Extra- Prostate Findings: Bladder midline, incompletely distended with no focal  abnormality. No discrete lesion seen at the rectosigmoid colon and the pelvic  bowel loops are unremarkable. No vascular lesion seen.     IMPRESSION:  1. Bilateral extensive prostate peripheral zone signal abnormalities worrisome  for tumor.  There is asymmetric prominent material posterior laterally at the  right apex which has Pirads 5 characteristics. The findings in this region very  suspicious for extracapsular extension as well. Some of this material does come  in proximity to the position of the neurovascular bundle at the apex. Elsewhere  changes are well evident bilaterally at the mid gland as well. 2. I do not see evidence for metastatic lymphadenopathy or metastatic bone  disease on this exam.      IMPRESSION:  Kendrick Richardson is a 71 y.o. male with Adenocarcinoma of the prostate, T2a, Cutler score 3+4 = 7, PSA 5.4. He was treated with CyberKnife SBRT completed on 03/20/19. COUNSELING AND COORDINATION OF CARE: I have had a 25 minute follow-up with Mr. Yolanda Mccain, greater than half of which has been spent counseling him about continued management of this favorable intermediate risk prostate cancer and the side effects associated with treatment. He will continue Flomax. He continues to have an excellent initial PSA response to radiation therapy. He is scheduled with Dr. Lara Carr in June. I will see him again in September with with PSA and testosterone checked prior to his visit. He has requested following Dr. Cayla Villafuerte to [facility]. I appreciate the opportunity to participate in Mr. Urszula cabrales. Madeleine Do NP   February 18, 2020      Portions of this note were copied from prior encounters and reviewed for accuracy, currency, and represent documentation and tasks completed during this encounter. I verify and attest these portions to be unchanged from prior visits.         CC:  Huong Trivedi MD

## 2020-02-18 NOTE — PROGRESS NOTES
06/04/2020 - Urology (Dr Abiola Holbrook)  RT End: 03/26/2019  Prostate SBRT Meka Berger    LABS:  02/11/2020 - PSA: 0.9, Test: 370  11/12/2019 - PSA: 1.080, Test: 413    02/11/2020 - Urin analysis for burning - NEG    Flomax    History of deep rectal pain  -DBF ordered Proctofoam - Patient is no longer using. AUA: 1, Pleased    Patient is complaining of lower back pain as well as slight burning when urinating. States he saw his PCP yesterday and was prescribed an anti inflammatory. Patient is noticing slight relief.       Suni Valdes, CMA

## 2022-07-01 ENCOUNTER — NURSE ONLY (OUTPATIENT)
Dept: UROLOGY | Age: 67
End: 2022-07-01

## 2022-07-01 DIAGNOSIS — C61 MALIGNANT NEOPLASM OF PROSTATE (HCC): Primary | ICD-10-CM

## 2022-07-01 DIAGNOSIS — C61 MALIGNANT NEOPLASM OF PROSTATE (HCC): ICD-10-CM

## 2022-07-01 LAB — PSA SERPL-MCNC: 0.2 NG/ML

## 2022-07-06 ENCOUNTER — OFFICE VISIT (OUTPATIENT)
Dept: UROLOGY | Age: 67
End: 2022-07-06
Payer: MEDICARE

## 2022-07-06 DIAGNOSIS — N40.1 BENIGN PROSTATIC HYPERPLASIA WITH LOWER URINARY TRACT SYMPTOMS, SYMPTOM DETAILS UNSPECIFIED: ICD-10-CM

## 2022-07-06 DIAGNOSIS — C61 MALIGNANT NEOPLASM OF PROSTATE (HCC): Primary | ICD-10-CM

## 2022-07-06 LAB
BILIRUBIN, URINE, POC: NEGATIVE
BLOOD URINE, POC: NEGATIVE
GLUCOSE URINE, POC: NEGATIVE
KETONES, URINE, POC: NEGATIVE
LEUKOCYTE ESTERASE, URINE, POC: NEGATIVE
NITRITE, URINE, POC: NEGATIVE
PH, URINE, POC: 5.5 (ref 4.6–8)
PROTEIN,URINE, POC: NEGATIVE
SPECIFIC GRAVITY, URINE, POC: 1.03 (ref 1–1.03)
URINALYSIS CLARITY, POC: NORMAL
URINALYSIS COLOR, POC: NORMAL
UROBILINOGEN, POC: NORMAL

## 2022-07-06 PROCEDURE — 81003 URINALYSIS AUTO W/O SCOPE: CPT | Performed by: UROLOGY

## 2022-07-06 PROCEDURE — 1123F ACP DISCUSS/DSCN MKR DOCD: CPT | Performed by: UROLOGY

## 2022-07-06 PROCEDURE — 3017F COLORECTAL CA SCREEN DOC REV: CPT | Performed by: UROLOGY

## 2022-07-06 PROCEDURE — G8427 DOCREV CUR MEDS BY ELIG CLIN: HCPCS | Performed by: UROLOGY

## 2022-07-06 PROCEDURE — 1036F TOBACCO NON-USER: CPT | Performed by: UROLOGY

## 2022-07-06 PROCEDURE — G8417 CALC BMI ABV UP PARAM F/U: HCPCS | Performed by: UROLOGY

## 2022-07-06 PROCEDURE — 99214 OFFICE O/P EST MOD 30 MIN: CPT | Performed by: UROLOGY

## 2022-07-06 NOTE — PROGRESS NOTES
[facility]  [place], [address]  [phone]    Rita Doing  : 1955     HPI   75 y.o., male returns in follow up for CaP.  S/P SBRT on 3/20/19 for [place] 7 disease under direction of Dr. Cody Swain.  Pretx PSA was 5.4.  PSA was 1.08 on 19; 0.9 on 20; 1.0 20; 1.2 on 20; 0.8 on 10/6/21 and is now 0.2 on 22. Yolande Russ denies any recurrent UTI's or pneumaturia.  Cysto on 19 showed mild radiation cystitis changes.  Voiding well on Flomax with occ nocturia. Unable to titrate off in past.      Past Medical History:   Diagnosis Date    Agatston coronary artery calcium score between 100 and 199     Calcium score of 665- f/u with Cardio    DVT (deep venous thrombosis) ([address] 2011: LLE DVT    H/O complete eye exam 2021    [facility]    Hypercholesteremia     Hyperlipidemia 2011    Hypertension     Insomnia     Melanoma ([address] 2019    Left upper leg. Followed by Dermatology.  Prostate cancer [facility]) Dx 2018    Cyber-knife radiation from Feb-2019    Recurrent UTI     s/p radiation for prostate ca    Squamous cell carcinoma     on the penis     Past Surgical History:   Procedure Laterality Date    COLONOSCOPY      f/u 5 years    MALIGNANT SKIN LESION EXCISION  2019    Melanoma removed from left upper leg.      ORTHOPEDIC SURGERY Bilateral     Achilles Tendon Repair    OTHER SURGICAL HISTORY      Squamous cell ca removed from penis    REPAIR/GRAFT ACHILLES TENDON      right ; left 10/2011    UPPER GASTROINTESTINAL ENDOSCOPY  2011    UROLOGICAL SURGERY  2019    Fiducial Marker and placement of space oar gel (for treatment of prostate ca)     Current Outpatient Medications   Medication Sig Dispense Refill    Flaxseed, Linseed, (FLAXSEED OIL PO) Take 1 tablet by mouth daily      amLODIPine-benazepril (LOTREL) 5-10 MG per capsule TAKE ONE CAPSULE BY MOUTH EVERY EVENING      ascorbic acid (VITAMIN C) 500 MG tablet Take 500 mg by mouth      aspirin 81 MG EC tablet Take by mouth daily      atorvastatin (LIPITOR) 80 MG tablet TAKE ONE TABLET BY MOUTH ONE TIME DAILY      vitamin D 25 MCG (1000 UT) CAPS Take by mouth daily      tamsulosin (FLOMAX) 0.4 MG capsule Take 0.4 mg by mouth daily      vitamin E 400 UNIT capsule Take 400 Units by mouth      LORazepam (ATIVAN) 1 MG tablet Take 0.5-1 mg by mouth. No current facility-administered medications for this visit. Allergies   Allergen Reactions    Cephalexin Itching     Palms/feet. Diarrhea. Social History     Socioeconomic History    Marital status:      Spouse name: Not on file    Number of children: Not on file    Years of education: Not on file    Highest education level: Not on file   Occupational History    Not on file   Tobacco Use    Smoking status: Former Smoker     Packs/day: 0.25     Quit date: 1980     Years since quittin.5    Smokeless tobacco: Never Used   Substance and Sexual Activity    Alcohol use: Yes     Alcohol/week: 3.0 - 4.0 standard drinks    Drug use: No     Types: Prescription    Sexual activity: Not on file   Other Topics Concern    Not on file   Social History Narrative    Not on file     Social Determinants of Health     Financial Resource Strain:     Difficulty of Paying Living Expenses: Not on file   Food Insecurity:     Worried About Running Out of Food in the Last Year: Not on file    [place]aka of Food in the Last Year: Not on file   Transportation Needs:     Lack of Transportation (Medical): Not on file    Lack of Transportation (Non-Medical):  Not on file   Physical Activity:     Days of Exercise per Week: Not on file    Minutes of Exercise per Session: Not on file   Stress:     Feeling of Stress : Not on file   Social Connections:     Frequency of Communication with Friends and Family: Not on file    Frequency of Social Gatherings with Friends and Family: Not on file    Attends Mosque Services: Not on file    Active Member of Clubs or Organizations: Not on file    Attends Club or Organization Meetings: Not on file    Marital Status: Not on file   Intimate Partner Violence:     Fear of Current or Ex-Partner: Not on file    Emotionally Abused: Not on file    Physically Abused: Not on file    Sexually Abused: Not on file   Housing Stability:     Unable to Pay for Housing in the Last Year: Not on file    Number of [place] in the Last Year: Not on file    Unstable Housing in the Last Year: Not on file     Family History   Problem Relation Age of Onset    Arrhythmia Sister 70        s/p ablation    Hypertension Mother     Alzheimer's Disease Mother     Hypertension Father     Heart Disease Father     Cancer Sister         melanoma    Hypertension Sister         controlled with diet       Review of Systems  All systems reviewed and are negative at this time. Physical Exam  There were no vitals taken for this visit.   General appearance - alert, well appearing, and in no distress  Mental status - alert, oriented to person, place, and time  Eyes - extraocular eye movements intact, sclera anicteric  Abdomen - soft, nontender, nondistended, no masses or organomegaly  Neurological -  normal speech, no focal findings or movement disorder noted  Skin - normal coloration and turgor      Urinalysis  UA - Dipstick  Results for orders placed or performed in visit on 07/06/22   AMB POC URINALYSIS DIP STICK AUTO W/O MICRO   Result Value Ref Range    Color (UA POC)      Clarity (UA POC)      Glucose, Urine, POC Negative Negative    Bilirubin, Urine, POC Negative Negative    KETONES, Urine, POC Negative Negative    Specific Gravity, Urine, POC 1.030 1.001 - 1.035    Blood (UA POC) Negative Negative    pH, Urine, POC 5.5 4.6 - 8.0    Protein, Urine, POC Negative Negative    Urobilinogen, POC 0.2 mg/dL     Nitrite, Urine, POC Negative Negative Leukocyte Esterase, Urine, POC Negative Negative         UA - Micro  WBC - 0  RBC - 0  Bacteria - 0  Epith - 0    Assessment/Plan    ICD-10-CM    1. Malignant neoplasm of prostate (HCC)  C61 AMB POC URINALYSIS DIP STICK AUTO W/O MICRO     PSA, ultrasensitive   2. Benign prostatic hyperplasia with lower urinary tract symptoms, symptom details unspecified  N40.1      RTO in 12 mo with PSA prior. He may retry to titrate off Flomax.     AMILCAR ORELLANA, DO

## 2022-07-29 ENCOUNTER — TELEPHONE (OUTPATIENT)
Dept: FAMILY MEDICINE CLINIC | Facility: CLINIC | Age: 67
End: 2022-07-29

## 2022-08-02 ENCOUNTER — TELEPHONE (OUTPATIENT)
Dept: FAMILY MEDICINE CLINIC | Facility: CLINIC | Age: 67
End: 2022-08-02

## 2022-08-08 RX ORDER — AMLODIPINE BESYLATE AND BENAZEPRIL HYDROCHLORIDE 5; 10 MG/1; MG/1
CAPSULE ORAL
Qty: 90 CAPSULE | Refills: 3 | OUTPATIENT
Start: 2022-08-08

## 2022-08-08 RX ORDER — ATORVASTATIN CALCIUM 80 MG/1
TABLET, FILM COATED ORAL
Qty: 90 TABLET | Refills: 3 | OUTPATIENT
Start: 2022-08-08

## 2022-08-15 RX ORDER — ATORVASTATIN CALCIUM 80 MG/1
80 TABLET, FILM COATED ORAL DAILY
Qty: 90 TABLET | Refills: 3 | Status: SHIPPED | OUTPATIENT
Start: 2022-08-15

## 2022-08-15 RX ORDER — AMLODIPINE BESYLATE AND BENAZEPRIL HYDROCHLORIDE 5; 10 MG/1; MG/1
1 CAPSULE ORAL DAILY
Qty: 90 CAPSULE | Refills: 3 | Status: SHIPPED | OUTPATIENT
Start: 2022-08-15

## 2022-08-15 NOTE — TELEPHONE ENCOUNTER
We had to cancel patient's AMW visit on 8/19 due to Dr. Kaitlin Craig being out. He needs two scripts filled 1)Amlodipine 2)Atorvastatin to [facility] in [facility], he has about 1 week. Thank you!

## 2022-09-22 ENCOUNTER — NURSE TRIAGE (OUTPATIENT)
Dept: OTHER | Facility: CLINIC | Age: 67
End: 2022-09-22

## 2022-09-22 NOTE — TELEPHONE ENCOUNTER
Received call from [facility] with [employer] Complaint. Subjective: Caller states \"I have had severe neck pain over the last 10 days and it is radiating into my temples. \"     States was seen at urgent care (Monday)  for neck pain had x rays done and was given prednisone and a muscle relaxer. No new or worsening symptoms since being seen at urgent  care    Has been prescribed PT for neck pain which starts tomorrow. Speech is clear    Current Symptoms: 10 days      Onset: 10 days ago;       Pain Severity: 7/10; Temperature: denies     Denies - new numbness or weakness on one side of the body / loss of vision / double vision    What has been tried: ibuprofen        Recommended disposition: See in Office Today    Care advice provided, patient verbalizes understanding; denies any other questions or concerns; instructed to call back for any new or worsening symptoms. Patient/Caller agrees with recommended disposition; writer provided warm transfer to [facility] for appointment scheduling     Attention Provider: Thank you for allowing me to participate in the care of your patient. The patient was connected to triage in response to information provided to the ECC/PSC. Please do not respond through this encounter as the response is not directed to a shared pool.         Reason for Disposition   Patient wants to be seen    Protocols used: Headache-ADULT-OH

## 2022-09-26 ENCOUNTER — OFFICE VISIT (OUTPATIENT)
Dept: FAMILY MEDICINE CLINIC | Facility: CLINIC | Age: 67
End: 2022-09-26
Payer: MEDICARE

## 2022-09-26 VITALS
OXYGEN SATURATION: 97 % | HEIGHT: 70 IN | HEART RATE: 76 BPM | DIASTOLIC BLOOD PRESSURE: 74 MMHG | BODY MASS INDEX: 29.06 KG/M2 | SYSTOLIC BLOOD PRESSURE: 120 MMHG | WEIGHT: 203 LBS

## 2022-09-26 DIAGNOSIS — M54.2 NECK PAIN: Primary | ICD-10-CM

## 2022-09-26 DIAGNOSIS — G44.209 MUSCLE CONTRACTION HEADACHE: ICD-10-CM

## 2022-09-26 DIAGNOSIS — I82.4Y9 ACUTE DEEP VEIN THROMBOSIS (DVT) OF PROXIMAL VEIN OF LOWER EXTREMITY, UNSPECIFIED LATERALITY (HCC): ICD-10-CM

## 2022-09-26 PROCEDURE — 3017F COLORECTAL CA SCREEN DOC REV: CPT | Performed by: FAMILY MEDICINE

## 2022-09-26 PROCEDURE — G8417 CALC BMI ABV UP PARAM F/U: HCPCS | Performed by: FAMILY MEDICINE

## 2022-09-26 PROCEDURE — 99213 OFFICE O/P EST LOW 20 MIN: CPT | Performed by: FAMILY MEDICINE

## 2022-09-26 PROCEDURE — 1123F ACP DISCUSS/DSCN MKR DOCD: CPT | Performed by: FAMILY MEDICINE

## 2022-09-26 PROCEDURE — 1036F TOBACCO NON-USER: CPT | Performed by: FAMILY MEDICINE

## 2022-09-26 PROCEDURE — G8427 DOCREV CUR MEDS BY ELIG CLIN: HCPCS | Performed by: FAMILY MEDICINE

## 2022-09-26 RX ORDER — BUTALBITAL, ACETAMINOPHEN AND CAFFEINE 50; 325; 40 MG/1; MG/1; MG/1
1 TABLET ORAL EVERY 6 HOURS PRN
Qty: 30 TABLET | Refills: 3 | Status: SHIPPED | OUTPATIENT
Start: 2022-09-26

## 2022-09-26 RX ORDER — CYCLOBENZAPRINE HCL 10 MG
10 TABLET ORAL 3 TIMES DAILY PRN
Qty: 40 TABLET | Refills: 1 | Status: SHIPPED | OUTPATIENT
Start: 2022-09-26

## 2022-09-26 RX ORDER — PREDNISONE 10 MG/1
10 TABLET ORAL
Qty: 1 EACH | Refills: 0 | Status: SHIPPED | OUTPATIENT
Start: 2022-09-26 | End: 2022-10-02

## 2022-09-26 ASSESSMENT — PATIENT HEALTH QUESTIONNAIRE - PHQ9
SUM OF ALL RESPONSES TO PHQ QUESTIONS 1-9: 1
SUM OF ALL RESPONSES TO PHQ9 QUESTIONS 1 & 2: 1
SUM OF ALL RESPONSES TO PHQ QUESTIONS 1-9: 1
SUM OF ALL RESPONSES TO PHQ QUESTIONS 1-9: 1
2. FEELING DOWN, DEPRESSED OR HOPELESS: 0
1. LITTLE INTEREST OR PLEASURE IN DOING THINGS: 1
SUM OF ALL RESPONSES TO PHQ QUESTIONS 1-9: 1

## 2022-09-26 ASSESSMENT — ENCOUNTER SYMPTOMS
EYES NEGATIVE: 1
GASTROINTESTINAL NEGATIVE: 1
RESPIRATORY NEGATIVE: 1

## 2022-09-26 NOTE — PROGRESS NOTES
HISTORY OF PRESENT ILLNESS    Dixon Lindsay is a 75 y.o. male. HPI  Chief Complaint   Patient presents with    Neck Pain     Neck pain x 3-4 wks, no injury. Numbness on bilateral hands ( AM only )     Headache     See above. Note pt had DVT in 2011. Was treated and has not returned. Followed by urologist for prostate cancer; stable. History of periodic neck pain in the past. Onset of current episode 3-4 weeks ago. Some constant soreness in back of neck. Worsens with bending neck. Numbness in thumb and index fingers can occur on both sides but resolves readily with change in position. Seen at urgent care. X-ray indicated degenerative changes. Some improvement with steroids and muscle relaxant but symptoms have increased. Starts PT today. Current Outpatient Medications on File Prior to Visit   Medication Sig Dispense Refill    amLODIPine-benazepril (LOTREL) 5-10 MG per capsule Take 1 capsule by mouth in the morning. TAKE ONE CAPSULE BY MOUTH EVERY EVENING. 90 capsule 3    atorvastatin (LIPITOR) 80 MG tablet Take 1 tablet by mouth in the morning. TAKE ONE TABLET BY MOUTH ONE TIME DAILY. 90 tablet 3    Flaxseed, Linseed, (FLAXSEED OIL PO) Take 1 tablet by mouth daily      ascorbic acid (VITAMIN C) 500 MG tablet Take 500 mg by mouth      aspirin 81 MG EC tablet Take by mouth daily      vitamin D 25 MCG (1000 UT) CAPS Take by mouth daily      tamsulosin (FLOMAX) 0.4 MG capsule Take 0.4 mg by mouth daily      vitamin E 400 UNIT capsule Take 400 Units by mouth       No current facility-administered medications on file prior to visit.      Past Medical History:   Diagnosis Date    Agatston coronary artery calcium score between 100 and 199 2016    Calcium score of 665- f/u with Cardio    [facility] 11/14/2011 11/2011: LLE DVT    H/O complete eye exam 07/2021    [facility]    Hypercholesteremia     Hyperlipidemia 11/14/2011    Hypertension     Insomnia     Mel[facility]oma ([facility] 07/2019    Left upper leg. Followed by Dermatology. Prostate cancer [facility]) Dx 11/2018    Cyber-knife radiation from Feb-March 2019    Recurrent UTI     s/p radiation for prostate ca    Squamous cell carcinoma     on the penis       Review of Systems   Constitutional: Negative. HENT: Negative. Eyes: Negative. Respiratory: Negative. Cardiovascular: Negative. Gastrointestinal: Negative. Genitourinary: Negative. Musculoskeletal:  Positive for neck pain. Neurological:  Positive for numbness (resolves with position change) and headaches. Negative for dizziness, facial asymmetry and light-headedness. Blood pressure 120/74, pulse 76, height 5' 10\" (1.778 m), weight 203 lb (92.1 kg), SpO2 97 %. Physical Exam  Vitals and nursing note reviewed. Constitutional:       Appearance: Normal appearance. HENT:      Mouth/Throat:      Mouth: Mucous membranes are moist.      Pharynx: Oropharynx is clear. Eyes:      Conjunctiva/sclera: Conjunctivae normal.   Neck:      Vascular: No carotid bruit. Comments: Moderate non-tender spasm posteriorly. Tender with flexion and extension. Mild tenderness with rotation of head. Cardiovascular:      Rate and Rhythm: Normal rate and regular rhythm. Pulses: Normal pulses. Heart sounds: Normal heart sounds. Pulmonary:      Breath sounds: Normal breath sounds. Musculoskeletal:         General: No swelling. Cervical back: Neck supple. Lymphadenopathy:      Cervical: No cervical adenopathy. Neurological:      General: No focal deficit present. Cranial Nerves: No cranial nerve deficit. Deep Tendon Reflexes: Reflexes normal.   Psychiatric:         Mood and Affect: Mood normal.        ASSESSMENT and PLAN    Rodney Mcpherson was seen today for neck pain and headache.     Diagnoses and all orders for this visit:    Neck pain    Acute deep vein thrombosis (DVT) of proximal vein of lower extremity, unspecified laterality (HCC)    Muscle contraction headache Proceed with PT. Repeat prednisone and resume muscle relaxant. Follow up if numbness does not resolve with change in posture. Return in about 4 weeks (around 10/24/2022), or if symptoms worsen or fail to improve.     Ramona Garcia MD

## 2022-09-28 ENCOUNTER — HOSPITAL ENCOUNTER (EMERGENCY)
Age: 67
Discharge: HOME | End: 2022-09-28
Attending: EMERGENCY MEDICINE | Admitting: EMERGENCY MEDICINE
Payer: MEDICARE

## 2022-09-28 VITALS
BODY MASS INDEX: 29.06 KG/M2 | WEIGHT: 203 LBS | SYSTOLIC BLOOD PRESSURE: 145 MMHG | RESPIRATION RATE: 18 BRPM | HEIGHT: 70 IN | OXYGEN SATURATION: 97 % | HEART RATE: 97 BPM | TEMPERATURE: 98.6 F | DIASTOLIC BLOOD PRESSURE: 85 MMHG

## 2022-09-28 DIAGNOSIS — S00.83XA BRUISE OF FACE, INITIAL ENCOUNTER: Primary | ICD-10-CM

## 2022-09-28 PROCEDURE — 99282 EMERGENCY DEPT VISIT SF MDM: CPT

## 2022-09-28 ASSESSMENT — ENCOUNTER SYMPTOMS
NAUSEA: 0
DIARRHEA: 0
EYE REDNESS: 0
VOMITING: 0
ABDOMINAL PAIN: 0
SORE THROAT: 0
BACK PAIN: 0
SHORTNESS OF BREATH: 0
COUGH: 0

## 2022-09-28 ASSESSMENT — PAIN SCALES - GENERAL
PAINLEVEL_OUTOF10: 0

## 2022-09-28 ASSESSMENT — PAIN - FUNCTIONAL ASSESSMENT
PAIN_FUNCTIONAL_ASSESSMENT: NONE - DENIES PAIN
PAIN_FUNCTIONAL_ASSESSMENT: 0-10

## 2022-09-28 NOTE — ED PROVIDER NOTES
Emergency Department Provider Note                   PCP:                Fam Krishna MD               Age: 75 y.o. Sex: male       ICD-10-CM    1. Bruise of face, initial encounter  S00.83XA           DISPOSITION Decision To Discharge 09/28/2022 02:45:31 PM        MDM  Number of Diagnoses or Management Options  Bruise of face, initial encounter  Diagnosis management comments: Patient is a 65-year-old male presenting with what appears to be an ecchymosis on the right side of the face. He does not recall any preceding trauma or injury. Patient is afebrile, nontoxic in appearance, vital signs within appropriate limits. No pain with palpation to the surrounding area. Patient does take 81 mg of aspirin daily and promptly bruising. Reassured patient that he should continue his muscle relaxers and steroids as previously prescribed by his doctor. Discussed follow-up as well as reasons to return to the ER. Patient agreeable to plan. No orders of the defined types were placed in this encounter. Medications - No data to display    Discharge Medication List as of 9/28/2022  2:51 PM           Amira Carballo is a 75 y.o. male who presents to the Emergency Department with chief complaint of  No chief complaint on file. Patient is a 65-year-old male who presents with complaint of rash on face x1 day. Patient states that 2 weeks ago he started having pain and stiffness in his neck that was causing him to have a bad headache. He went to urgent care and was diagnosed with degenerative disc disease and started on muscle relaxers, steroids and physical therapy. This Monday he had his first physical therapy appointment and went back to see his primary doctor who started him on a longer course of prednisone, continued muscle relaxers and also started him on a Motrin with caffeine for his headache.   States that overall his head and neck is feeling much better and he feels like physical therapy has been very beneficial.  He woke up this morning and noticed a faint red area to the right side of his face. He states that the area does not hurt does not itch and would not know it was there had his wife not noticed it. He was concerned it could be some sort of side effect from all of his medications that he is currently on. Does take an 81 mg aspirin daily. Denies any trauma or injury to his face that he can recall. The history is provided by the patient. Review of Systems   Constitutional:  Negative for activity change, appetite change, chills, fatigue and fever. HENT:  Negative for congestion and sore throat. Eyes:  Negative for redness. Respiratory:  Negative for cough and shortness of breath. Cardiovascular:  Negative for chest pain. Gastrointestinal:  Negative for abdominal pain, diarrhea, nausea and vomiting. Genitourinary:  Negative for hematuria. Musculoskeletal:  Negative for back pain and neck pain. Skin:  Positive for rash (Right side of face). Negative for pallor and wound. Neurological:  Negative for light-headedness and headaches. Psychiatric/Behavioral:  Negative for confusion. Past Medical History:   Diagnosis Date    Agatston coronary artery calcium score between 100 and 199 2016    Calcium score of 665- f/u with Cardio    DV[facility] ([facility] venous thrombosis) [facility] 11/14/2011 11/2011: LLE DVT    H/O complete eye exam 07/2021    [facility]    Hypercholesteremia     Hyperlipidemia 11/14/2011    Hypertension     Insomnia     Melanoma ([facility] 07/2019    Left upper leg. Followed by Dermatology. Prostate cancer [facility]) Dx 11/2018    Cyber-knife radiation from Feb-March 2019    Recurrent UTI     s/p radiation for prostate ca    Squamous cell carcinoma     on the penis        Past Surgical History:   Procedure Laterality Date    COLONOSCOPY  2016    f/u 5 years    MALIGNANT SKIN LESION EXCISION  07/2019    Melanoma removed from left upper leg.      ORTHOPEDIC

## 2022-09-28 NOTE — ED NOTES
I have reviewed discharge instructions with the patient. The patient verbalized understanding. Patient left ED via Discharge Method: ambulatory to Home with self. Opportunity for questions and clarification provided. Patient given 0 scripts. To continue your aftercare when you leave the hospital, you may receive an automated call from our care team to check in on how you are doing. This is a free service and part of our promise to provide the best care and service to meet your aftercare needs.  If you have questions, or wish to unsubscribe from this service please call [phone]. Thank you for Choosing our [facility] Emergency Department.         Steven Llamas RN  09/28/22 2312

## 2022-09-28 NOTE — ED TRIAGE NOTES
Pt has been recently placed on 3 new medications for his stiff neck and headaches. Pt was placed on cyclobenzaprine, fioricet, and a prednisone pack. Reports waking up this morning with redness/purple skin marking to his right cheek. States it was not there last night.  Denies injury/trauma

## 2022-10-24 ENCOUNTER — TELEMEDICINE (OUTPATIENT)
Dept: FAMILY MEDICINE CLINIC | Facility: CLINIC | Age: 67
End: 2022-10-24
Payer: MEDICARE

## 2022-10-24 DIAGNOSIS — R51.9 DAILY HEADACHE: Primary | ICD-10-CM

## 2022-10-24 DIAGNOSIS — M54.2 NECK PAIN: ICD-10-CM

## 2022-10-24 PROCEDURE — 1036F TOBACCO NON-USER: CPT | Performed by: FAMILY MEDICINE

## 2022-10-24 PROCEDURE — G8484 FLU IMMUNIZE NO ADMIN: HCPCS | Performed by: FAMILY MEDICINE

## 2022-10-24 PROCEDURE — 1123F ACP DISCUSS/DSCN MKR DOCD: CPT | Performed by: FAMILY MEDICINE

## 2022-10-24 PROCEDURE — 3017F COLORECTAL CA SCREEN DOC REV: CPT | Performed by: FAMILY MEDICINE

## 2022-10-24 PROCEDURE — G8428 CUR MEDS NOT DOCUMENT: HCPCS | Performed by: FAMILY MEDICINE

## 2022-10-24 PROCEDURE — G8417 CALC BMI ABV UP PARAM F/U: HCPCS | Performed by: FAMILY MEDICINE

## 2022-10-24 PROCEDURE — 99213 OFFICE O/P EST LOW 20 MIN: CPT | Performed by: FAMILY MEDICINE

## 2022-10-24 RX ORDER — TOPIRAMATE 25 MG/1
TABLET ORAL
Qty: 70 TABLET | Refills: 0 | Status: SHIPPED | OUTPATIENT
Start: 2022-10-24

## 2022-10-24 ASSESSMENT — ENCOUNTER SYMPTOMS
GASTROINTESTINAL NEGATIVE: 1
RESPIRATORY NEGATIVE: 1
EYES NEGATIVE: 1

## 2022-10-24 NOTE — PROGRESS NOTES
10/24/2022    TELEHEALTH EVALUATION -- Audio/Visual (During TIMSC-61 public health emergency)    HPI:    Steven Small (:  1955) has requested an audio/video evaluation for the following concern(s):    States that neck pain is responding well to PT and traction. Continues to have daily headaches for over a month. Begin in back of neck and radiates to the bitemporal area. Periodically gets a pulsation in the left ear. No vision change. Denies nausea. Fioricet helps temporarily but symptoms return. BP has not been elevated. Past Medical History:   Diagnosis Date    Agatston coronary artery calcium score between 100 and 199     Calcium score of 665- f/u with Cardio    DVT (deep venous thrombosis) ([facility] 2011: LLE DVT    H/O complete eye exam 2021    [facility]    Hypercholesteremia     Hyperlipidemia 2011    Hypertension     Insomnia     Melanoma ([facility] 2019    Left upper leg. Followed by Dermatology. Prostate cancer [facility]) Dx 2018    Cyber-knife radiation from Feb-2019    Recurrent UTI     s/p radiation for prostate ca    Squamous cell carcinoma     on the penis         Review of Systems   Constitutional: Negative. Eyes: Negative. Respiratory: Negative. Cardiovascular: Negative. Gastrointestinal: Negative. Genitourinary: Negative. Musculoskeletal:  Positive for neck pain (improving). Neurological:  Positive for headaches. Negative for dizziness, tremors, seizures, syncope, facial asymmetry, speech difficulty, weakness, light-headedness and numbness. Prior to Visit Medications    Medication Sig Taking?  Authorizing Provider   topiramate (TOPAMAX) 25 MG tablet Take one tablet qhs for 7 days, then 1 tablet by mouth twice daily for 7 days, then 1 tablet qam and 2 tablets qpm x 7 days then 2 tablets bid Yes Cherelle Norris MD   butalbital-acetaminophen-caffeine (FIORICET, ESGIC) -59 MG per tablet Take 1 tablet by mouth every 6 hours as needed for Headaches  Fermin June MD   cyclobenzaprine (FLEXERIL) 10 MG tablet Take 1 tablet by mouth 3 times daily as needed for Muscle spasms  Fermin June MD   amLODIPine-benazepril (LOTREL) 5-10 MG per capsule Take 1 capsule by mouth in the morning. TAKE ONE CAPSULE BY MOUTH EVERY EVENING. Fermin June MD   atorvastatin (LIPITOR) 80 MG tablet Take 1 tablet by mouth in the morning. TAKE ONE TABLET BY MOUTH ONE TIME DAILY. Fermin June MD   Flaxseed, Linseed, (FLAXSEED OIL PO) Take 1 tablet by mouth daily  Ar Automatic Reconciliation   ascorbic acid (VITAMIN C) 500 MG tablet Take 500 mg by mouth  Ar Automatic Reconciliation   aspirin 81 MG EC tablet Take by mouth daily  Ar Automatic Reconciliation   vitamin D 25 MCG (1000 UT) CAPS Take by mouth daily  Ar Automatic Reconciliation   tamsulosin (FLOMAX) 0.4 MG capsule Take 0.4 mg by mouth daily  Ar Automatic Reconciliation   vitamin E 400 UNIT capsule Take 400 Units by mouth  Ar Automatic Reconciliation       Social History     Tobacco Use    Smoking status: Former     Packs/day: 0.25     Types: Cigarettes     Quit date: 1980     Years since quittin.8    Smokeless tobacco: Never   Substance Use Topics    Alcohol use:  Yes     Alcohol/week: 3.0 - 4.0 standard drinks    Drug use: No     Types: Prescription            PHYSICAL EXAMINATION:  [ INSTRUCTIONS:  \"[x]\" Indicates a positive item  \"[]\" Indicates a negative item  -- DELETE ALL ITEMS NOT EXAMINED]  Vital Signs: (As obtained by patient/caregiver or practitioner observation)    Blood pressure-  Heart rate-    Respiratory rate-    Temperature-  Pulse oximetry-     Constitutional: [x] Appears well-developed and well-nourished [] No apparent distress      [] Abnormal-   Mental status  [x] Alert and awake  [] Oriented to person/place/time []Able to follow commands      Eyes:  EOM    []  Normal  [] Abnormal-  Sclera  []  Normal  [] Abnormal -         Discharge [] None visible  [] Abnormal -    HENT:   [] Normocephalic, atraumatic. [] Abnormal   [] Mouth/Throat: Mucous membranes are moist.     External Ears [] Normal  [] Abnormal-     Neck: [] No visualized mass     Pulmonary/Chest: [x] Respiratory effort normal.  [] No visualized signs of difficulty breathing or respiratory distress        [] Abnormal-      Musculoskeletal:   [] Normal gait with no signs of ataxia         [] Normal range of motion of neck        [] Abnormal-       Neurological:        [x] No Facial Asymmetry (Cranial nerve 7 motor function) (limited exam to video visit)          [] No gaze palsy        [] Abnormal-         Skin:        [] No significant exanthematous lesions or discoloration noted on facial skin         [] Abnormal-            Psychiatric:       [x] Normal Affect [] No Hallucinations        [] Abnormal-     Other pertinent observable physical exam findings-     ASSESSMENT/PLAN:  1. Daily headache  Instructed in titration of Topamax as a preventive. Follow up according to response. Notify MRI results. - MRI BRAIN W CONTRAST; Future    2. Neck pain  Continue therapy. Rx sent to patient for home traction unit. Return in about 4 weeks (around 11/21/2022), or if symptoms worsen or fail to improve. Khushboo Neal, was evaluated through a synchronous (real-time) audio-video encounter. The patient (or guardian if applicable) is aware that this is a billable service, which includes applicable co-pays. This Virtual Visit was conducted with patient's (and/or legal guardian's) consent. The visit was conducted pursuant to the emergency declaration under the [address] [place] [address] authority and the [employer] Act. Patient identification was verified, and a caregiver was present when appropriate. The patient was located at Home: [address] [place] [address].    Provider was located at Facility (Appt Dept): [facility] ([facility])  [facility],  [address] [place] [address] Drive. Total time spent on this encounter: Not billed by time    --Jose M Torres MD on 10/24/2022 at 4:40 PM    An electronic signature was used to authenticate this note.

## 2022-11-09 ENCOUNTER — PATIENT MESSAGE (OUTPATIENT)
Dept: FAMILY MEDICINE CLINIC | Facility: CLINIC | Age: 67
End: 2022-11-09

## 2022-11-09 ENCOUNTER — HOSPITAL ENCOUNTER (OUTPATIENT)
Dept: MRI IMAGING | Age: 67
Discharge: HOME | End: 2022-11-12
Payer: MEDICARE

## 2022-11-09 DIAGNOSIS — H53.9 VISION CHANGES: ICD-10-CM

## 2022-11-09 DIAGNOSIS — R51.9 DAILY HEADACHE: ICD-10-CM

## 2022-11-09 DIAGNOSIS — R51.9 NONINTRACTABLE HEADACHE, UNSPECIFIED CHRONICITY PATTERN, UNSPECIFIED HEADACHE TYPE: Primary | ICD-10-CM

## 2022-11-09 PROCEDURE — A9579 GAD-BASE MR CONTRAST NOS,1ML: HCPCS | Performed by: FAMILY MEDICINE

## 2022-11-09 PROCEDURE — 6360000004 HC RX CONTRAST MEDICATION: Performed by: FAMILY MEDICINE

## 2022-11-09 PROCEDURE — 2580000003 HC RX 258: Performed by: FAMILY MEDICINE

## 2022-11-09 PROCEDURE — 70553 MRI BRAIN STEM W/O & W/DYE: CPT

## 2022-11-09 RX ORDER — SODIUM CHLORIDE 0.9 % (FLUSH) 0.9 %
20 SYRINGE (ML) INJECTION AS NEEDED
Status: DISCONTINUED | OUTPATIENT
Start: 2022-11-09 | End: 2022-11-13 | Stop reason: HOSPADM

## 2022-11-09 RX ADMIN — SODIUM CHLORIDE, PRESERVATIVE FREE 20 ML: 5 INJECTION INTRAVENOUS at 11:24

## 2022-11-09 RX ADMIN — GADOTERIDOL 18 ML: 279.3 INJECTION, SOLUTION INTRAVENOUS at 11:23

## 2022-11-28 ENCOUNTER — OFFICE VISIT (OUTPATIENT)
Dept: FAMILY MEDICINE CLINIC | Facility: CLINIC | Age: 67
End: 2022-11-28
Payer: MEDICARE

## 2022-11-28 ENCOUNTER — NURSE ONLY (OUTPATIENT)
Dept: FAMILY MEDICINE CLINIC | Facility: CLINIC | Age: 67
End: 2022-11-28

## 2022-11-28 VITALS
TEMPERATURE: 98.3 F | OXYGEN SATURATION: 97 % | DIASTOLIC BLOOD PRESSURE: 62 MMHG | WEIGHT: 202 LBS | SYSTOLIC BLOOD PRESSURE: 110 MMHG | HEART RATE: 83 BPM | BODY MASS INDEX: 28.98 KG/M2

## 2022-11-28 DIAGNOSIS — Z85.46 HISTORY OF PROSTATE CANCER: ICD-10-CM

## 2022-11-28 DIAGNOSIS — N40.0 BENIGN PROSTATIC HYPERPLASIA WITHOUT LOWER URINARY TRACT SYMPTOMS: ICD-10-CM

## 2022-11-28 DIAGNOSIS — Z00.00 ENCOUNTER FOR ANNUAL WELLNESS VISIT (AWV) IN MEDICARE PATIENT: ICD-10-CM

## 2022-11-28 DIAGNOSIS — E78.5 HYPERLIPIDEMIA, UNSPECIFIED HYPERLIPIDEMIA TYPE: ICD-10-CM

## 2022-11-28 DIAGNOSIS — Z00.00 ENCOUNTER FOR SUBSEQUENT ANNUAL WELLNESS VISIT (AWV) IN MEDICARE PATIENT: Primary | ICD-10-CM

## 2022-11-28 DIAGNOSIS — Z00.00 ENCOUNTER FOR ANNUAL WELLNESS VISIT (AWV) IN MEDICARE PATIENT: Primary | ICD-10-CM

## 2022-11-28 DIAGNOSIS — I10 HYPERTENSION, UNSPECIFIED TYPE: ICD-10-CM

## 2022-11-28 DIAGNOSIS — G43.009 ATYPICAL MIGRAINE: ICD-10-CM

## 2022-11-28 DIAGNOSIS — I10 PRIMARY HYPERTENSION: ICD-10-CM

## 2022-11-28 LAB
ALBUMIN SERPL-MCNC: 3.7 G/DL (ref 3.2–4.6)
ALBUMIN/GLOB SERPL: 1.3 {RATIO} (ref 0.4–1.6)
ALP SERPL-CCNC: 76 U/L (ref 50–136)
ALT SERPL-CCNC: 25 U/L (ref 12–65)
ANION GAP SERPL CALC-SCNC: 9 MMOL/L (ref 2–11)
APPEARANCE UR: CLEAR
AST SERPL-CCNC: 13 U/L (ref 15–37)
BACTERIA URNS QL MICRO: NEGATIVE /HPF
BASOPHILS # BLD: 0.1 K/UL (ref 0–0.2)
BASOPHILS NFR BLD: 1 % (ref 0–2)
BILIRUB SERPL-MCNC: 0.5 MG/DL (ref 0.2–1.1)
BILIRUB UR QL: NEGATIVE
BUN SERPL-MCNC: 16 MG/DL (ref 8–23)
CALCIUM SERPL-MCNC: 9 MG/DL (ref 8.3–10.4)
CASTS URNS QL MICRO: NORMAL /LPF (ref 0–2)
CHLORIDE SERPL-SCNC: 105 MMOL/L (ref 101–110)
CHOLEST SERPL-MCNC: 150 MG/DL
CO2 SERPL-SCNC: 26 MMOL/L (ref 21–32)
COLOR UR: NORMAL
CREAT SERPL-MCNC: 1 MG/DL (ref 0.8–1.5)
DIFFERENTIAL METHOD BLD: ABNORMAL
EOSINOPHIL # BLD: 0.2 K/UL (ref 0–0.8)
EOSINOPHIL NFR BLD: 3 % (ref 0.5–7.8)
EPI CELLS #/AREA URNS HPF: NORMAL /HPF (ref 0–5)
ERYTHROCYTE [DISTWIDTH] IN BLOOD BY AUTOMATED COUNT: 14.5 % (ref 11.9–14.6)
GLOBULIN SER CALC-MCNC: 2.9 G/DL (ref 2.8–4.5)
GLUCOSE SERPL-MCNC: 104 MG/DL (ref 65–100)
GLUCOSE UR STRIP.AUTO-MCNC: NEGATIVE MG/DL
HCT VFR BLD AUTO: 39.2 % (ref 41.1–50.3)
HDLC SERPL-MCNC: 60 MG/DL (ref 40–60)
HDLC SERPL: 2.5 {RATIO}
HGB BLD-MCNC: 12.7 G/DL (ref 13.6–17.2)
HGB UR QL STRIP: NEGATIVE
IMM GRANULOCYTES # BLD AUTO: 0 K/UL (ref 0–0.5)
IMM GRANULOCYTES NFR BLD AUTO: 0 % (ref 0–5)
KETONES UR QL STRIP.AUTO: NEGATIVE MG/DL
LDLC SERPL CALC-MCNC: 77.6 MG/DL
LEUKOCYTE ESTERASE UR QL STRIP.AUTO: NEGATIVE
LYMPHOCYTES # BLD: 1.3 K/UL (ref 0.5–4.6)
LYMPHOCYTES NFR BLD: 22 % (ref 13–44)
MCH RBC QN AUTO: 30.8 PG (ref 26.1–32.9)
MCHC RBC AUTO-ENTMCNC: 32.4 G/DL (ref 31.4–35)
MCV RBC AUTO: 94.9 FL (ref 82–102)
MONOCYTES # BLD: 0.7 K/UL (ref 0.1–1.3)
MONOCYTES NFR BLD: 12 % (ref 4–12)
MUCOUS THREADS URNS QL MICRO: 0 /LPF
NEUTS SEG # BLD: 3.7 K/UL (ref 1.7–8.2)
NEUTS SEG NFR BLD: 62 % (ref 43–78)
NITRITE UR QL STRIP.AUTO: NEGATIVE
NRBC # BLD: 0 K/UL (ref 0–0.2)
PH UR STRIP: 6 [PH] (ref 5–9)
PLATELET # BLD AUTO: 267 K/UL (ref 150–450)
PMV BLD AUTO: 10.7 FL (ref 9.4–12.3)
POTASSIUM SERPL-SCNC: 4.5 MMOL/L (ref 3.5–5.1)
PROT SERPL-MCNC: 6.6 G/DL (ref 6.3–8.2)
PROT UR STRIP-MCNC: NEGATIVE MG/DL
PSA SERPL-MCNC: 0.1 NG/ML
RBC # BLD AUTO: 4.13 M/UL (ref 4.23–5.6)
RBC #/AREA URNS HPF: NORMAL /HPF (ref 0–5)
SODIUM SERPL-SCNC: 140 MMOL/L (ref 133–143)
SP GR UR REFRACTOMETRY: 1.02 (ref 1–1.02)
TRIGL SERPL-MCNC: 62 MG/DL (ref 35–150)
TSH, 3RD GENERATION: 0.65 UIU/ML (ref 0.36–3.74)
URINE CULTURE IF INDICATED: NORMAL
UROBILINOGEN UR QL STRIP.AUTO: 0.2 EU/DL (ref 0.2–1)
VLDLC SERPL CALC-MCNC: 12.4 MG/DL (ref 6–23)
WBC # BLD AUTO: 6 K/UL (ref 4.3–11.1)
WBC URNS QL MICRO: NORMAL /HPF (ref 0–4)

## 2022-11-28 PROCEDURE — 1036F TOBACCO NON-USER: CPT | Performed by: FAMILY MEDICINE

## 2022-11-28 PROCEDURE — 3017F COLORECTAL CA SCREEN DOC REV: CPT | Performed by: FAMILY MEDICINE

## 2022-11-28 PROCEDURE — G0439 PPPS, SUBSEQ VISIT: HCPCS | Performed by: FAMILY MEDICINE

## 2022-11-28 PROCEDURE — G8484 FLU IMMUNIZE NO ADMIN: HCPCS | Performed by: FAMILY MEDICINE

## 2022-11-28 PROCEDURE — G8427 DOCREV CUR MEDS BY ELIG CLIN: HCPCS | Performed by: FAMILY MEDICINE

## 2022-11-28 PROCEDURE — 1123F ACP DISCUSS/DSCN MKR DOCD: CPT | Performed by: FAMILY MEDICINE

## 2022-11-28 PROCEDURE — G8417 CALC BMI ABV UP PARAM F/U: HCPCS | Performed by: FAMILY MEDICINE

## 2022-11-28 PROCEDURE — 3078F DIAST BP <80 MM HG: CPT | Performed by: FAMILY MEDICINE

## 2022-11-28 PROCEDURE — 99214 OFFICE O/P EST MOD 30 MIN: CPT | Performed by: FAMILY MEDICINE

## 2022-11-28 PROCEDURE — 3074F SYST BP LT 130 MM HG: CPT | Performed by: FAMILY MEDICINE

## 2022-11-28 ASSESSMENT — ENCOUNTER SYMPTOMS
RESPIRATORY NEGATIVE: 1
GASTROINTESTINAL NEGATIVE: 1
EYES NEGATIVE: 1

## 2022-11-28 ASSESSMENT — PATIENT HEALTH QUESTIONNAIRE - PHQ9
SUM OF ALL RESPONSES TO PHQ9 QUESTIONS 1 & 2: 0
SUM OF ALL RESPONSES TO PHQ QUESTIONS 1-9: 0
SUM OF ALL RESPONSES TO PHQ QUESTIONS 1-9: 0
1. LITTLE INTEREST OR PLEASURE IN DOING THINGS: 0
SUM OF ALL RESPONSES TO PHQ QUESTIONS 1-9: 0
2. FEELING DOWN, DEPRESSED OR HOPELESS: 0
SUM OF ALL RESPONSES TO PHQ QUESTIONS 1-9: 0

## 2022-11-28 ASSESSMENT — LIFESTYLE VARIABLES
HOW OFTEN DO YOU HAVE A DRINK CONTAINING ALCOHOL: MONTHLY OR LESS
HOW MANY STANDARD DRINKS CONTAINING ALCOHOL DO YOU HAVE ON A TYPICAL DAY: 1 OR 2

## 2022-11-28 NOTE — PROGRESS NOTES
HISTORY OF PRESENT ILLNESS    Vivian Mckeon is a 75 y.o. male. HPI  Chief Complaint   Patient presents with    Medicare AWV     See above. Feeling well. Stable on current regimen. Followed by urology for prostate cancer. PSA has remained low. Neck pain has largely resolved with PT. History of migraines is responding to Topamax. No side effects from BP medication. No headache or vision change. Denies chest pain or SOB. No swelling. Tolerating statin with no side effects. Current Outpatient Medications on File Prior to Visit   Medication Sig Dispense Refill    topiramate (TOPAMAX) 25 MG tablet Take one tablet qhs for 7 days, then 1 tablet by mouth twice daily for 7 days, then 1 tablet qam and 2 tablets qpm x 7 days then 2 tablets bid 70 tablet 0    amLODIPine-benazepril (LOTREL) 5-10 MG per capsule Take 1 capsule by mouth in the morning. TAKE ONE CAPSULE BY MOUTH EVERY EVENING. 90 capsule 3    atorvastatin (LIPITOR) 80 MG tablet Take 1 tablet by mouth in the morning. TAKE ONE TABLET BY MOUTH ONE TIME DAILY. 90 tablet 3    Flaxseed, Linseed, (FLAXSEED OIL PO) Take 1 tablet by mouth daily      ascorbic acid (VITAMIN C) 500 MG tablet Take 500 mg by mouth      aspirin 81 MG EC tablet Take by mouth daily      vitamin D 25 MCG (1000 UT) CAPS Take by mouth daily      tamsulosin (FLOMAX) 0.4 MG capsule Take 0.4 mg by mouth daily      vitamin E 400 UNIT capsule Take 400 Units by mouth      butalbital-acetaminophen-caffeine (FIORICET, ESGIC) -40 MG per tablet Take 1 tablet by mouth every 6 hours as needed for Headaches (Patient not taking: Reported on 11/28/2022) 30 tablet 3    cyclobenzaprine (FLEXERIL) 10 MG tablet Take 1 tablet by mouth 3 times daily as needed for Muscle spasms (Patient not taking: Reported on 11/28/2022) 40 tablet 1     No current facility-administered medications on file prior to visit.      Past Medical History:   Diagnosis Date    Agatston coronary artery calcium score between 100 and 199 2016    Calcium score of 665- f/u with Cardio    DVT (deep venous thrombosis) ([facility] [address] 11/14/2011 11/2011: LLE DVT    H/O complete eye exam 07/2021    [facility]    Hypercholesteremia     Hyperlipidemia 11/14/2011    Hypertension     Insomnia     Melanoma ([facility] [address] 07/2019    Left upper leg. Followed by Dermatology. Prostate cancer [facility]) Dx 11/2018    Cyber-knife radiation from Feb-March 2019    Recurrent UTI     s/p radiation for prostate ca    Squamous cell carcinoma     on the penis       Review of Systems   Constitutional: Negative. HENT: Negative. Eyes: Negative. Respiratory: Negative. Cardiovascular: Negative. Gastrointestinal: Negative. Genitourinary: Negative. Musculoskeletal: Negative. Neurological: Negative. Psychiatric/Behavioral: Negative. Blood pressure 110/62, pulse 83, temperature 98.3 °F (36.8 °C), temperature source Temporal, weight 202 lb (91.6 kg), SpO2 97 %. Physical Exam  Vitals and nursing note reviewed. Constitutional:       Appearance: Normal appearance. HENT:      Right Ear: Tympanic membrane normal.      Left Ear: Tympanic membrane normal.      Nose: Nose normal.      Mouth/Throat:      Mouth: Mucous membranes are moist.      Pharynx: Oropharynx is clear. Eyes:      Extraocular Movements: Extraocular movements intact. Conjunctiva/sclera: Conjunctivae normal.      Pupils: Pupils are equal, round, and reactive to light. Neck:      Vascular: No carotid bruit. Cardiovascular:      Rate and Rhythm: Normal rate and regular rhythm. Heart sounds: Normal heart sounds. Pulmonary:      Breath sounds: Normal breath sounds. Abdominal:      General: Bowel sounds are normal. There is no distension. Palpations: Abdomen is soft. There is no mass. Tenderness: There is no abdominal tenderness. There is no guarding. Hernia: No hernia is present. There is no hernia in the left inguinal area or right inguinal area.    Genitourinary: Pubic Area: No rash. Epididymis:      Right: Normal.      Left: Normal.      Comments: Deferred due to urologist's exam  Musculoskeletal:         General: No swelling or tenderness. Normal range of motion. Cervical back: Neck supple. Lymphadenopathy:      Cervical: No cervical adenopathy. Skin:     General: Skin is warm. Findings: No rash. Neurological:      Cranial Nerves: No cranial nerve deficit. Deep Tendon Reflexes: Reflexes normal.   Psychiatric:         Mood and Affect: Mood normal.        ASSESSMENT and PLAN    Suzette Green was seen today for medicare awv. Diagnoses and all orders for this visit:    Encounter for subsequent annual wellness visit (AWV) in Medicare patient    History of prostate cancer    Primary hypertension    Hyperlipidemia, unspecified hyperlipidemia type    Atypical migraine   Discussed history and medications with patient. Continue current measures. Follow up if side effects occur or if new symptoms develop. Notify lab results      Return in 1 year (on 11/28/2023), or if symptoms worsen or fail to improve, for Medicare Annual Wellness Visit in 1 year. Armand Scott MD  Medicare Annual Wellness Visit    Edilbertohennatg Frankelred is here for Medicare AWV    Assessment & Plan   Encounter for subsequent annual wellness visit (AWV) in Medicare patient  History of prostate cancer  Primary hypertension  Hyperlipidemia, unspecified hyperlipidemia type  Atypical migraine    Recommendations for Preventive Services Due: see orders and patient instructions/AVS.  Recommended screening schedule for the next 5-10 years is provided to the patient in written form: see Patient Instructions/AVS.     Return in 1 year (on 11/28/2023), or if symptoms worsen or fail to improve, for Medicare Annual Wellness Visit in 1 year. Subjective       Patient's complete Health Risk Assessment and screening values have been reviewed and are found in Flowsheets.  The following problems were reviewed today and where indicated follow up appointments were made and/or referrals ordered. Positive Risk Factor Screenings with Interventions:             General Health and ACP:  General  In general, how would you say your health is?: Very Good  In the past 7 days, have you experienced any of the following: New or Increased Pain, New or Increased Fatigue, Loneliness, Social Isolation, Stress or Anger?: No  Do you get the social and emotional support that you need?: Yes  Do you have a Living Will?: Yes    Advance Directives       Power of  Living Will ACP-Advance Directive ACP-Power of     Not on File Not on File Not on File Not on File        General Health Risk Interventions:                Objective   Vitals:    11/28/22 1443   BP: 110/62   Site: Left Upper Arm   Position: Sitting   Cuff Size: Medium Adult   Pulse: 83   Temp: 98.3 °F (36.8 °C)   TempSrc: Temporal   SpO2: 97%   Weight: 202 lb (91.6 kg)      Body mass index is 28.98 kg/m². Allergies   Allergen Reactions    Cephalexin Itching     Palms/feet. Diarrhea. Prior to Visit Medications    Medication Sig Taking? Authorizing Provider   topiramate (TOPAMAX) 25 MG tablet Take one tablet qhs for 7 days, then 1 tablet by mouth twice daily for 7 days, then 1 tablet qam and 2 tablets qpm x 7 days then 2 tablets bid Yes Addie Caraballo MD   amLODIPine-benazepril (LOTREL) 5-10 MG per capsule Take 1 capsule by mouth in the morning. TAKE ONE CAPSULE BY MOUTH EVERY EVENING. Yes Addie Caraballo MD   atorvastatin (LIPITOR) 80 MG tablet Take 1 tablet by mouth in the morning. TAKE ONE TABLET BY MOUTH ONE TIME DAILY.  Yes Addie Caraballo MD   Flaxseed, Linseed, (FLAXSEED OIL PO) Take 1 tablet by mouth daily Yes Ar Automatic Reconciliation   ascorbic acid (VITAMIN C) 500 MG tablet Take 500 mg by mouth Yes Ar Automatic Reconciliation   aspirin 81 MG EC tablet Take by mouth daily Yes Ar Automatic Reconciliation   vitamin D 25 MCG (1000 UT) CAPS Take by mouth daily Yes Ar Automatic Reconciliation   tamsulosin (FLOMAX) 0.4 MG capsule Take 0.4 mg by mouth daily Yes Ar Automatic Reconciliation   vitamin E 400 UNIT capsule Take 400 Units by mouth Yes Ar Automatic Reconciliation   butalbital-acetaminophen-caffeine (FIORICET, ESGIC) -40 MG per tablet Take 1 tablet by mouth every 6 hours as needed for Headaches  Patient not taking: Reported on 11/28/2022  Cassandra Deleon MD   cyclobenzaprine (FLEXERIL) 10 MG tablet Take 1 tablet by mouth 3 times daily as needed for Muscle spasms  Patient not taking: Reported on 11/28/2022  Cassandra Deleon MD       [facility] (Including outside providers/suppliers regularly involved in providing care):   Patient Care Team:  Cassandra Deleon MD as PCP - General  Cassandra Deleon MD as PCP - [facility] Empaneled Provider     Reviewed and updated this visit:  Tobacco  Allergies  Meds  Med Hx  Surg Hx  Soc Hx  Fam Hx

## 2022-11-28 NOTE — PATIENT INSTRUCTIONS
Personalized Preventive Plan for Nuris Mayberry - 11/28/2022  Medicare offers a range of preventive health benefits. Some of the tests and screenings are paid in full while other may be subject to a deductible, co-insurance, and/or copay. Some of these benefits include a comprehensive review of your medical history including lifestyle, illnesses that may run in your family, and various assessments and screenings as appropriate. After reviewing your medical record and screening and assessments performed today your provider may have ordered immunizations, labs, imaging, and/or referrals for you. A list of these orders (if applicable) as well as your Preventive Care list are included within your After Visit Summary for your review. Other Preventive Recommendations:    A preventive eye exam performed by an eye specialist is recommended every 1-2 years to screen for glaucoma; cataracts, macular degeneration, and other eye disorders. A preventive dental visit is recommended every 6 months. Try to get at least 150 minutes of exercise per week or 10,000 steps per day on a pedometer . Order or download the FREE \"Exercise & Physical Activity: Your Everyday Guide\" from [employer]. Call 5-166.323.6275 or search [employer] online. You need 8660-5092 mg of calcium and 7752-4154 IU of vitamin D per day. It is possible to meet your calcium requirement with diet alone, but a vitamin D supplement is usually necessary to meet this goal.  When exposed to the sun, use a sunscreen that protects against both UVA and UVB radiation with an SPF of 30 or greater. Reapply every 2 to 3 hours or after sweating, drying off with a towel, or swimming. Always wear a seat belt when traveling in a car. Always wear a helmet when riding a bicycle or motorcycle.

## 2023-02-21 NOTE — PROGRESS NOTES
Patient: Maria Isabel Cho  MRN: [id number]  : 1955    CC: Headaches    HPI:   Maria Isabel Cho is a 75 y. o.yo male here for new patient evaluation for headaches. Referred by Bi Fong., *    He is here today by himself. Denies headaches, stated headache free for past 6 mths. Headaches are located bilateral occipital region and radiate frontal region behind his eyes. They began ~6 months ago. The current frequency of headaches: none within past 30 days. Duration: 4 hours. Severity is 9/10. Quality of headaches are described as throbbing, cervicalgia. Associated symptoms: photophobia, nausea. The headaches are exacerbated by movement of neck. Factors that relieve the headaches are PT and cervical exercises. Family Members with headache history: none     Current Medications used for HA treatment: none    Medications tried in the past: fioricet, cyclobenzaprine, tylenol, topiramate    Associated medical problems: prostate cancer, squamous cell carcinoma, HTN,  HLD, insomnia,recurrent UTIs, DVT, cervical radiculopathy, former tobacco use    Previous Imaging: MRI of brain demonstrated chronic small vessel disease; no acute abnormalities. Review of Systems   Review of Systems   Constitutional: Negative. HENT: Negative. Eyes:  Positive for photophobia and visual disturbance. Respiratory: Negative. Cardiovascular: Negative. Gastrointestinal:  Positive for nausea. Endocrine: Negative. Genitourinary: Negative. Musculoskeletal:  Positive for neck pain. Skin: Negative. Allergic/Immunologic: Negative. Neurological:  Positive for headaches. Hematological: Negative. Psychiatric/Behavioral:  Positive for sleep disturbance. Negative for suicidal ideas.         Past Medical History:  Past Medical History:   Diagnosis Date    Agatston coronary artery calcium score between 100 and 199     Calcium score of 665- f/u with Cardio    DVT (deep venous thrombosis) ([facility] [address] 11/14/2011 11/2011: LLE DVT    H/O complete eye exam 07/2021    [facility]    Hypercholesteremia     Hyperlipidemia 11/14/2011    Hypertension     Insomnia     Melanoma ([facility] [address] 07/2019    Left upper leg. Followed by Dermatology. Prostate cancer [facility]) Dx 11/2018    Cyber-knife radiation from Feb-March 2019    Recurrent UTI     s/p radiation for prostate ca    Squamous cell carcinoma     on the penis     Past Surgical History:   Procedure Laterality Date    COLONOSCOPY  2016    f/u 5 years    MALIGNANT SKIN LESION EXCISION  07/2019    Melanoma removed from left upper leg. ORTHOPEDIC SURGERY Bilateral 2011    Achilles Tendon Repair    OTHER SURGICAL HISTORY  2010    Squamous cell ca removed from penis    REPAIR/GRAFT ACHILLES TENDON      right 1987; left 10/2011    UPPER GASTROINTESTINAL ENDOSCOPY  2/2011    UROLOGICAL SURGERY  02/13/2019    Fiducial Marker and placement of space oar gel (for treatment of prostate ca)     Family History   Problem Relation Age of Onset    Arrhythmia Sister 70        s/p ablation    Hypertension Mother     Alzheimer's Disease Mother     Hypertension Father     Heart Disease Father     Cancer Sister         melanoma    Hypertension Sister         controlled with diet       Current Medications:  Current Outpatient Medications   Medication Sig Dispense Refill    vitamin B-12 (CYANOCOBALAMIN) 1000 MCG tablet Take 1,000 mcg by mouth daily      amLODIPine-benazepril (LOTREL) 5-10 MG per capsule Take 1 capsule by mouth in the morning. TAKE ONE CAPSULE BY MOUTH EVERY EVENING. 90 capsule 3    atorvastatin (LIPITOR) 80 MG tablet Take 1 tablet by mouth in the morning. TAKE ONE TABLET BY MOUTH ONE TIME DAILY.  90 tablet 3    Flaxseed, Linseed, (FLAXSEED OIL PO) Take 1 tablet by mouth daily      ascorbic acid (VITAMIN C) 500 MG tablet Take 500 mg by mouth      vitamin D 25 MCG (1000 UT) CAPS Take by mouth daily      tamsulosin (FLOMAX) 0.4 MG capsule Take 0.4 mg by mouth daily      vitamin E 400 UNIT capsule Take 400 Units by mouth      topiramate (TOPAMAX) 25 MG tablet Take one tablet qhs for 7 days, then 1 tablet by mouth twice daily for 7 days, then 1 tablet qam and 2 tablets qpm x 7 days then 2 tablets bid (Patient not taking: Reported on 2023) 70 tablet 0    aspirin 81 MG EC tablet Take by mouth daily       No current facility-administered medications for this visit. Allergies: Allergies   Allergen Reactions    Cephalexin Itching     Palms/feet. Diarrhea. Social History:  Social History     Socioeconomic History    Marital status:    Tobacco Use    Smoking status: Former     Packs/day: 0.25     Types: Cigarettes     Quit date: 1980     Years since quittin.1    Smokeless tobacco: Never   Substance and Sexual Activity    Alcohol use: Yes     Alcohol/week: 3.0 - 4.0 standard drinks    Drug use: No     Types: Prescription     Social Determinants of Health     Physical Activity: Sufficiently Active    Days of Exercise per Week: 4 days    Minutes of Exercise per Session: 60 min       Family History:  Family History   Problem Relation Age of Onset    Arrhythmia Sister 70        s/p ablation    Hypertension Mother     Alzheimer's Disease Mother     Hypertension Father     Heart Disease Father     Cancer Sister         melanoma    Hypertension Sister         controlled with diet       Vital Signs:  /80   Pulse 72   Ht 5' 11\" (1.803 m)   Wt 206 lb 9.6 oz (93.7 kg)   SpO2 95%   BMI 28.81 kg/m²     Physical Exam:  General:  No acute distress. Head: atraumatic. normocephalic. Heart: Regular rate/rhythm; without murmur  Respiratory: non-labored respirations, without cough/audible wheeze  Neurological:  Mental Status: Alert and oriented x 3  Cranial Nerves:   I- deferred  II, III, IV, VI-Pupils equal, round and reactive to light. Extraocular movements intact. Visual fields full.    V-Facial sensation intact to light touch V1-V3  VII-Face symmetric  VIII-Hearing intact to finger rub  IX-deferred  X, XII-Speech clear and fluent. Tongue midline  XI-Shoulder shrug strong and symmetric    Sensation: intact to light touch in bilateral upper and lower extremities  Strength: normal tone. 5/5 strength in bilateral deltoids, biceps, triceps, interossei, hip flexors, knee extensors, knee flexors, ankle flexion and dorsiflexion. Without tremor. Cerebellar function: Finger-to-nose bilateral upper extremities intact. Negative Romberg  Reflexes: deep tendon reflexes- 2+ bilateral biceps, brachioradialis, triceps, patellar, ankle jerks. Toes: downgoing bilaterally  Gait: casual and tandem steady    MRI Result (most recent):  MRI BRAIN W WO CONTRAST 11/09/2022    Narrative  EXAMINATION: BRAIN MRI 11/9/2022 11:25 AM    ACCESSION NUMBER: [id number]    INDICATION: Headache, unspecified evaluate headache daily headache    COMPARISON: None available    TECHNIQUE: Multiplanar multisequence MRI of the brain without and with  intravenous administration of 18 cc ProHance contrast agent. FINDINGS:    Midline structures including the corpus callosum, pituitary gland, optic nerves,  and cerebellum are well developed. The ventricles are within normal limits for the mild degree of global brain  parenchymal volume loss. There is no midline shift. The basilar cisterns are  patent. There is no cerebellar tonsillar ectopia or herniation. There are a few nonspecific punctate T2 hyperintensities scattered throughout  the periventricular and subcortical white matter. Mild bilateral dependent sphenoid sinus mucosal thickening. Diffusion imaging shows no evidence of acute infarction or other acute  abnormality. The expected large intracranial vascular flow voids are preserved. There is no  evidence of intracranial blood products. There is no abnormal intra or extra-axial postcontrast enhancement. There are no suspicious osseous lesions. Impression  1. There are a few punctate T2 hyperintensities scattered throughout the  periventricular and subcortical white matter. This is a nonspecific finding  which can be seen with a variety of etiologies, including migraine headaches and  a mild burden of chronic microangiopathy. 2. Otherwise unremarkable MRI of the brain for patient age. Assessment/ Plan:    Josselyn Call was seen today for new patient, headache and other. Diagnoses and all orders for this visit:    Cervicogenic headache  Given migrainous features and hx of cervical radiculopathy, recommend resuming topiramate 25 mg nightly for 7 days, increasing to 50 mg nightly for headache prevention. However patient would like to defer. Continue cervical exercises. Headache Education:   Instructed the patient on over-the-counter headache management medications including magnesium and butterbur. To avoid a pain medication overuse headache trying not to take pain medicines more than 3 doses a week. To help relieve headache symptoms without taking pain medicine lie down under darkroom and drink glass of water. Consider lifestyle modification including good sleep hygiene, routine medial schedules, regular exercise and managing triggers. Keep a headache diary  to reveal triggers and possible patterns. Triggers may be: Food, stress, perfumes, alcohol, or even chocolate. Drink plenty of water and try to get 8 hours of sleep each night to reduce risk factors that may cause headaches. Follow up as needed    I spent greater than 50% of the 60 total minutes of today's visit in coordination of care and patient/family education and counseling regarding the above patient concerns, reviewing the patient's medical record, my assessment and recommendations.       Cherelle Briggs NP  [facility]  [address] [facility], [address]  Phone: [phone]

## 2023-02-22 ENCOUNTER — OFFICE VISIT (OUTPATIENT)
Dept: NEUROLOGY | Age: 68
End: 2023-02-22
Payer: MEDICARE

## 2023-02-22 VITALS
OXYGEN SATURATION: 95 % | HEIGHT: 71 IN | DIASTOLIC BLOOD PRESSURE: 80 MMHG | WEIGHT: 206.6 LBS | SYSTOLIC BLOOD PRESSURE: 119 MMHG | BODY MASS INDEX: 28.92 KG/M2 | HEART RATE: 72 BPM

## 2023-02-22 DIAGNOSIS — G44.86 CERVICOGENIC HEADACHE: Primary | ICD-10-CM

## 2023-02-22 PROBLEM — C61 PROSTATE CANCER (HCC): Status: ACTIVE | Noted: 2019-01-17

## 2023-02-22 PROCEDURE — 3074F SYST BP LT 130 MM HG: CPT | Performed by: NURSE PRACTITIONER

## 2023-02-22 PROCEDURE — G8417 CALC BMI ABV UP PARAM F/U: HCPCS | Performed by: NURSE PRACTITIONER

## 2023-02-22 PROCEDURE — 1123F ACP DISCUSS/DSCN MKR DOCD: CPT | Performed by: NURSE PRACTITIONER

## 2023-02-22 PROCEDURE — 3017F COLORECTAL CA SCREEN DOC REV: CPT | Performed by: NURSE PRACTITIONER

## 2023-02-22 PROCEDURE — 99204 OFFICE O/P NEW MOD 45 MIN: CPT | Performed by: NURSE PRACTITIONER

## 2023-02-22 PROCEDURE — G8484 FLU IMMUNIZE NO ADMIN: HCPCS | Performed by: NURSE PRACTITIONER

## 2023-02-22 PROCEDURE — 3079F DIAST BP 80-89 MM HG: CPT | Performed by: NURSE PRACTITIONER

## 2023-02-22 PROCEDURE — G8427 DOCREV CUR MEDS BY ELIG CLIN: HCPCS | Performed by: NURSE PRACTITIONER

## 2023-02-22 PROCEDURE — 1036F TOBACCO NON-USER: CPT | Performed by: NURSE PRACTITIONER

## 2023-02-22 RX ORDER — LANOLIN ALCOHOL/MO/W.PET/CERES
1000 CREAM (GRAM) TOPICAL DAILY
COMMUNITY

## 2023-02-22 ASSESSMENT — ENCOUNTER SYMPTOMS
RESPIRATORY NEGATIVE: 1
PHOTOPHOBIA: 1
NAUSEA: 1
ALLERGIC/IMMUNOLOGIC NEGATIVE: 1

## 2023-02-22 ASSESSMENT — PATIENT HEALTH QUESTIONNAIRE - PHQ9
1. LITTLE INTEREST OR PLEASURE IN DOING THINGS: 0
SUM OF ALL RESPONSES TO PHQ QUESTIONS 1-9: 0
SUM OF ALL RESPONSES TO PHQ QUESTIONS 1-9: 0
2. FEELING DOWN, DEPRESSED OR HOPELESS: 0
SUM OF ALL RESPONSES TO PHQ QUESTIONS 1-9: 0
SUM OF ALL RESPONSES TO PHQ QUESTIONS 1-9: 0
SUM OF ALL RESPONSES TO PHQ9 QUESTIONS 1 & 2: 0

## 2023-07-05 ENCOUNTER — NURSE ONLY (OUTPATIENT)
Dept: UROLOGY | Age: 68
End: 2023-07-05

## 2023-07-05 DIAGNOSIS — C61 MALIGNANT NEOPLASM OF PROSTATE (HCC): ICD-10-CM

## 2023-07-07 LAB — PSA SERPL DL<=0.01 NG/ML-MCNC: 0.1 NG/ML (ref 0–4)

## 2023-07-12 ENCOUNTER — OFFICE VISIT (OUTPATIENT)
Dept: UROLOGY | Age: 68
End: 2023-07-12
Payer: MEDICARE

## 2023-07-12 DIAGNOSIS — C61 MALIGNANT NEOPLASM OF PROSTATE (HCC): Primary | ICD-10-CM

## 2023-07-12 DIAGNOSIS — N40.1 BENIGN PROSTATIC HYPERPLASIA WITH LOWER URINARY TRACT SYMPTOMS, SYMPTOM DETAILS UNSPECIFIED: ICD-10-CM

## 2023-07-12 LAB
BILIRUBIN, URINE, POC: NEGATIVE
BLOOD URINE, POC: NEGATIVE
GLUCOSE URINE, POC: NEGATIVE
KETONES, URINE, POC: NEGATIVE
LEUKOCYTE ESTERASE, URINE, POC: NEGATIVE
NITRITE, URINE, POC: NEGATIVE
PH, URINE, POC: 5.5 (ref 4.6–8)
PROTEIN,URINE, POC: NEGATIVE
SPECIFIC GRAVITY, URINE, POC: 1.03 (ref 1–1.03)
URINALYSIS CLARITY, POC: ABNORMAL
URINALYSIS COLOR, POC: ABNORMAL
UROBILINOGEN, POC: ABNORMAL

## 2023-07-12 PROCEDURE — 3017F COLORECTAL CA SCREEN DOC REV: CPT | Performed by: UROLOGY

## 2023-07-12 PROCEDURE — 99214 OFFICE O/P EST MOD 30 MIN: CPT | Performed by: UROLOGY

## 2023-07-12 PROCEDURE — 1123F ACP DISCUSS/DSCN MKR DOCD: CPT | Performed by: UROLOGY

## 2023-07-12 PROCEDURE — 81003 URINALYSIS AUTO W/O SCOPE: CPT | Performed by: UROLOGY

## 2023-07-12 PROCEDURE — G8417 CALC BMI ABV UP PARAM F/U: HCPCS | Performed by: UROLOGY

## 2023-07-12 PROCEDURE — G8427 DOCREV CUR MEDS BY ELIG CLIN: HCPCS | Performed by: UROLOGY

## 2023-07-12 PROCEDURE — 1036F TOBACCO NON-USER: CPT | Performed by: UROLOGY

## 2023-07-12 NOTE — PROGRESS NOTES
[facility]  [address] [address]  [phone]    Hilary Ybarra  : 1955     HPI   70 y.o., male returns in follow up for CaP. S/P SBRT on 3/20/19 for [place] 7 disease under direction of Dr. Dasia Leslie. Pretx PSA was 5.4. PSA was 1.08 on 19; 0.9 on 20; 1.0 20; 1.2 on 20; 0.8 on 10/6/21; 0.2 on 22 and is now 0.097 on 23. He denies any recurrent UTI's or pneumaturia. Cysto on 19 showed mild radiation cystitis changes. Voiding well on Flomax with occ nocturia. Unable to titrate off in past.      Past Medical History:   Diagnosis Date    Agatston coronary artery calcium score between 100 and 199 2016    Calcium score of 665- f/u with Cardio    DVT (deep venous thrombosis) ([address]) 2011: LLE DVT    H/O complete eye exam 2021    [facility]    Hypercholesteremia     Hyperlipidemia 2011    Hypertension     Insomnia     Melanoma ([address]) 2019    Left upper leg. Followed by Dermatology. Prostate cancer [facility]) Dx 2018    Cyber-knife radiation from Feb-2019    Recurrent UTI     s/p radiation for prostate ca    Squamous cell carcinoma     on the penis     Past Surgical History:   Procedure Laterality Date    COLONOSCOPY      f/u 5 years    MALIGNANT SKIN LESION EXCISION  2019    Melanoma removed from left upper leg.      ORTHOPEDIC SURGERY Bilateral     Achilles Tendon Repair    OTHER SURGICAL HISTORY      Squamous cell ca removed from penis    REPAIR/GRAFT ACHILLES TENDON      right ; left 10/2011    UPPER GASTROINTESTINAL ENDOSCOPY  2011    UROLOGICAL SURGERY  2019    Fiducial Marker and placement of space oar gel (for treatment of prostate ca)     Current Outpatient Medications   Medication Sig Dispense Refill    vitamin B-12 (CYANOCOBALAMIN) 1000 MCG tablet Take 1 tablet by mouth daily      topiramate (TOPAMAX) 25 MG tablet Take one tablet qhs for 7 days, then 1 tablet by mouth twice

## 2023-08-04 RX ORDER — AMLODIPINE BESYLATE AND BENAZEPRIL HYDROCHLORIDE 5; 10 MG/1; MG/1
1 CAPSULE ORAL DAILY
Qty: 90 CAPSULE | Refills: 3 | Status: SHIPPED | OUTPATIENT
Start: 2023-08-04

## 2023-08-04 RX ORDER — ATORVASTATIN CALCIUM 80 MG/1
80 TABLET, FILM COATED ORAL DAILY
Qty: 90 TABLET | Refills: 3 | Status: SHIPPED | OUTPATIENT
Start: 2023-08-04

## 2023-08-15 RX ORDER — AMLODIPINE BESYLATE AND BENAZEPRIL HYDROCHLORIDE 5; 10 MG/1; MG/1
CAPSULE ORAL
Qty: 90 CAPSULE | Refills: 3 | OUTPATIENT
Start: 2023-08-15

## 2023-08-15 RX ORDER — ATORVASTATIN CALCIUM 80 MG/1
TABLET, FILM COATED ORAL
Qty: 90 TABLET | Refills: 3 | OUTPATIENT
Start: 2023-08-15

## 2023-08-30 ENCOUNTER — TELEPHONE (OUTPATIENT)
Dept: FAMILY MEDICINE CLINIC | Facility: CLINIC | Age: 68
End: 2023-08-30

## 2023-08-30 NOTE — TELEPHONE ENCOUNTER
Called patient to reschedule AWV appt originally scheduled for 12/1/23 due to Dr Antonetta Kussmaul being out of the office.  Left a voicemail for patient to call back to reschedule

## 2024-07-10 ENCOUNTER — LAB (OUTPATIENT)
Dept: UROLOGY | Age: 69
End: 2024-07-10

## 2024-07-10 DIAGNOSIS — C61 MALIGNANT NEOPLASM OF PROSTATE (HCC): ICD-10-CM

## 2024-07-11 LAB — PSA SERPL DL<=0.01 NG/ML-MCNC: 0.06 NG/ML (ref 0–4)

## 2024-07-17 ENCOUNTER — OFFICE VISIT (OUTPATIENT)
Dept: UROLOGY | Age: 69
End: 2024-07-17
Payer: MEDICARE

## 2024-07-17 DIAGNOSIS — C61 MALIGNANT NEOPLASM OF PROSTATE (HCC): Primary | ICD-10-CM

## 2024-07-17 LAB
BILIRUBIN, URINE, POC: NEGATIVE
BLOOD URINE, POC: NEGATIVE
GLUCOSE URINE, POC: NEGATIVE
KETONES, URINE, POC: NEGATIVE
LEUKOCYTE ESTERASE, URINE, POC: NEGATIVE
NITRITE, URINE, POC: NEGATIVE
PH, URINE, POC: 6.5 (ref 4.6–8)
PROTEIN,URINE, POC: NEGATIVE
SPECIFIC GRAVITY, URINE, POC: 1.01 (ref 1–1.03)
URINALYSIS CLARITY, POC: NORMAL
URINALYSIS COLOR, POC: NORMAL
UROBILINOGEN, POC: NORMAL

## 2024-07-17 PROCEDURE — 1123F ACP DISCUSS/DSCN MKR DOCD: CPT | Performed by: UROLOGY

## 2024-07-17 PROCEDURE — G8421 BMI NOT CALCULATED: HCPCS | Performed by: UROLOGY

## 2024-07-17 PROCEDURE — 3017F COLORECTAL CA SCREEN DOC REV: CPT | Performed by: UROLOGY

## 2024-07-17 PROCEDURE — 99213 OFFICE O/P EST LOW 20 MIN: CPT | Performed by: UROLOGY

## 2024-07-17 PROCEDURE — 81003 URINALYSIS AUTO W/O SCOPE: CPT | Performed by: UROLOGY

## 2024-07-17 PROCEDURE — 1036F TOBACCO NON-USER: CPT | Performed by: UROLOGY

## 2024-07-17 PROCEDURE — G8427 DOCREV CUR MEDS BY ELIG CLIN: HCPCS | Performed by: UROLOGY

## 2024-07-17 NOTE — PROGRESS NOTES
[facility]  [address] [place] [address]  [phone]    Jaylen Castillo  : 1955     HPI   69 y.o., male returns in follow up for CaP.  S/P SBRT on 3/20/19 for Patricia 7 disease under direction of Dr. Justice.  Pretx PSA was 5.4.  PSA was 1.08 on 19; 0.9 on 20; 1.0 20; 1.2 on 20; 0.8 on 10/6/21; 0.2 on 22; 0.097 on 23 and is now 0.056 on 7/10/24.  He denies any recurrent UTI's or pneumaturia.  Cysto on 19 showed mild radiation cystitis changes.  Voiding well on Flomax with occ nocturia.  Unable to titrate off in past.       Past Medical History:   Diagnosis Date    Agatston coronary artery calcium score between 100 and 199     Calcium score of 665- f/u with Cardio    DVT (deep venous thrombosis) (HCC) 2011: LLE DVT    H/O complete eye exam 2021    [facility]    Hypercholesteremia     Hyperlipidemia 2011    Hypertension     Insomnia     Melanoma (HCC) 2019    Left upper leg. Followed by Dermatology.     Prostate cancer (HCC) Dx 2018    Cyber-knife radiation from Feb-2019    Recurrent UTI     s/p radiation for prostate ca    Squamous cell carcinoma     on the penis     Past Surgical History:   Procedure Laterality Date    COLONOSCOPY      f/u 5 years    MALIGNANT SKIN LESION EXCISION  2019    Melanoma removed from left upper leg.     ORTHOPEDIC SURGERY Bilateral     Achilles Tendon Repair    OTHER SURGICAL HISTORY      Squamous cell ca removed from penis    REPAIR/GRAFT ACHILLES TENDON      right ; left 10/2011    UPPER GASTROINTESTINAL ENDOSCOPY  2011    UROLOGICAL SURGERY  2019    Fiducial Marker and placement of space oar gel (for treatment of prostate ca)     Current Outpatient Medications   Medication Sig Dispense Refill    amLODIPine-benazepril (LOTREL) 5-10 MG per capsule Take 1 capsule by mouth daily TAKE ONE CAPSULE BY MOUTH EVERY EVENING 90 capsule 3    atorvastatin

## 2025-02-12 ENCOUNTER — OFFICE VISIT (OUTPATIENT)
Dept: ORTHOPEDIC SURGERY | Age: 70
End: 2025-02-12
Payer: MEDICARE

## 2025-02-12 DIAGNOSIS — M79.672 PAIN OF LEFT HEEL: ICD-10-CM

## 2025-02-12 DIAGNOSIS — M76.62 ACHILLES TENDINITIS, LEFT LEG: Primary | ICD-10-CM

## 2025-02-12 DIAGNOSIS — M54.50 LUMBAR PAIN: Primary | ICD-10-CM

## 2025-02-12 PROCEDURE — 99214 OFFICE O/P EST MOD 30 MIN: CPT | Performed by: ORTHOPAEDIC SURGERY

## 2025-02-12 PROCEDURE — G8421 BMI NOT CALCULATED: HCPCS | Performed by: ORTHOPAEDIC SURGERY

## 2025-02-12 PROCEDURE — 3017F COLORECTAL CA SCREEN DOC REV: CPT | Performed by: ORTHOPAEDIC SURGERY

## 2025-02-12 PROCEDURE — G8428 CUR MEDS NOT DOCUMENT: HCPCS | Performed by: ORTHOPAEDIC SURGERY

## 2025-02-12 PROCEDURE — 1036F TOBACCO NON-USER: CPT | Performed by: ORTHOPAEDIC SURGERY

## 2025-02-12 PROCEDURE — 1123F ACP DISCUSS/DSCN MKR DOCD: CPT | Performed by: ORTHOPAEDIC SURGERY

## 2025-02-12 NOTE — PROGRESS NOTES
Name: Jaylen Castillo  YOB: 1955  Gender: male  MRN: [id number]    Summary:   Left Achilles tendinitis with history of postop DVT, left C5-6 cervical radiculopathy       CC: New Patient (Left heel pain obtained xrays in office today )       HPI: Jaylen Castillo is a 69 y.o. male who presents with New Patient (Left heel pain obtained xrays in office today )  .  This patient presents the office today with a 14-year history of an Achilles tendon reconstruction about 14 years ago.  He has had some tightness in his leg on the side.  He has not stayed on oral blood thinners since the surgery.  He does wear compression stockings.  In addition he has some numbness in his fingers and has had been treated with cervical traction prior to this on the left-hand side in his median nerve distribution.    History was obtained by Patient     ROS/Meds/PSH/PMH/FH/SH: I personally reviewed the patients standard intake form.  Below are the pertinents    Tobacco:  reports that he quit smoking about 45 years ago. His smoking use included cigarettes. He has never used smokeless tobacco.  Diabetes: None      Physical Examination:      Exam of the left lower extremity shows an intact repair with no sign of infection or obvious sign of DVT noted.  Imaging:   Interpretation of imaging  Left foot and ankle XR: AP, Lateral, Oblique views     ICD-10-CM    1. Pain of left heel  M79.672 XR ANKLE LEFT (MIN 3 VIEWS)     XR FOOT LEFT (2 VIEWS)      2. Achilles tendinitis, left leg  M76.62          Report: AP, lateral, oblique x-ray of the left foot and demonstrates no definite fracture    Impression: No definite fracture   MEHNAZ LAY III, MD           Assessment:   Left Achilles tendinitis status post reconstruction with remote history of DVT, left cervical radiculopathy    Treatment Plan:   4 This is a chronic illness/condition with exacerbation and progression  Treatment at this time: Time with no intervention  Studies

## 2025-02-13 ENCOUNTER — OFFICE VISIT (OUTPATIENT)
Dept: ORTHOPEDIC SURGERY | Age: 70
End: 2025-02-13

## 2025-02-13 DIAGNOSIS — M47.812 CERVICAL SPONDYLOSIS: Primary | ICD-10-CM

## 2025-02-13 RX ORDER — PANTOPRAZOLE SODIUM 40 MG/1
40 TABLET, DELAYED RELEASE ORAL DAILY
COMMUNITY
Start: 2024-12-10

## 2025-02-13 RX ORDER — TIZANIDINE 2 MG/1
2 TABLET ORAL 3 TIMES DAILY PRN
Qty: 90 TABLET | Refills: 2 | Status: SHIPPED | OUTPATIENT
Start: 2025-02-13

## 2025-02-13 NOTE — PROGRESS NOTES
and stiffness we discussed options for management.  Will start with a referral to physical therapy for neck exercises as well as some tizanidine to help with symptoms.  He can also take an over-the-counter anti-inflammatory as needed.  I will plan to follow-up the patient in 8 weeks for recheck of his symptoms and if he continues to have pain I recommend a cervical MRI for further evaluation.      4--this is a chronic illness/condition with exacerbation      Clinical Notes:   I/my clinic will serve as the continuing focal point for all needed health care services and/or medical care services that are part of ongoing PAIN care related to patient's single, serious, or complex PAIN condition with the following diagnoses: Cervical spondylosis   Chronic Pain Condition that is not stable = not at the patient's treatment goal

## 2025-04-15 ENCOUNTER — OFFICE VISIT (OUTPATIENT)
Dept: ORTHOPEDIC SURGERY | Age: 70
End: 2025-04-15
Payer: MEDICARE

## 2025-04-15 DIAGNOSIS — M47.812 CERVICAL SPONDYLOSIS: Primary | ICD-10-CM

## 2025-04-15 PROCEDURE — G2211 COMPLEX E/M VISIT ADD ON: HCPCS | Performed by: PHYSICIAN ASSISTANT

## 2025-04-15 PROCEDURE — 4004F PT TOBACCO SCREEN RCVD TLK: CPT | Performed by: PHYSICIAN ASSISTANT

## 2025-04-15 PROCEDURE — 3017F COLORECTAL CA SCREEN DOC REV: CPT | Performed by: PHYSICIAN ASSISTANT

## 2025-04-15 PROCEDURE — 99213 OFFICE O/P EST LOW 20 MIN: CPT | Performed by: PHYSICIAN ASSISTANT

## 2025-04-15 PROCEDURE — G8428 CUR MEDS NOT DOCUMENT: HCPCS | Performed by: PHYSICIAN ASSISTANT

## 2025-04-15 PROCEDURE — 1123F ACP DISCUSS/DSCN MKR DOCD: CPT | Performed by: PHYSICIAN ASSISTANT

## 2025-04-15 PROCEDURE — G8421 BMI NOT CALCULATED: HCPCS | Performed by: PHYSICIAN ASSISTANT

## 2025-04-15 NOTE — PROGRESS NOTES
04/15/25        Name: Jaylen Castillo  YOB: 1955  Gender: male  MRN: [id number]    CC: Follow-up (Cervical Spine)       HPI: Jaylen Castillo is a 69 y.o. male who returns for Follow-up (Cervical Spine)         Last the patient the office 2/13/2025.  He has been going to physical therapy and has had good improvement of his neck pain and stiffness.  He still has some residual numbness tingling in his left hand.  Still feels very positional mostly at night and first thing in the morning.  Not really bothering him much during the day just occasionally with driving.  He is using a traction device for his neck that he bought through physical therapy at [facility].    History was obtained by patient      Meds/PSH/PMH/FH/SH: This information has been reviewed.      ALLERGIES:   Allergies   Allergen Reactions    Cephalexin Itching     Palms/feet. Diarrhea.              Physical Examination:            Imaging:         MRI Result (most recent):  MRI BRAIN W WO CONTRAST 11/09/2022    Narrative  EXAMINATION: BRAIN MRI 11/9/2022 11:25 AM    ACCESSION NUMBER: [id number]    INDICATION: Headache, unspecified evaluate headache daily headache    COMPARISON: None available    TECHNIQUE: Multiplanar multisequence MRI of the brain without and with  intravenous administration of 18 cc ProHance contrast agent.    FINDINGS:    Midline structures including the corpus callosum, pituitary gland, optic nerves,  and cerebellum are well developed.    The ventricles are within normal limits for the mild degree of global brain  parenchymal volume loss. There is no midline shift. The basilar cisterns are  patent. There is no cerebellar tonsillar ectopia or herniation.    There are a few nonspecific punctate T2 hyperintensities scattered throughout  the periventricular and subcortical white matter.    Mild bilateral dependent sphenoid sinus mucosal thickening.    Diffusion imaging shows no evidence of acute infarction or other

## 2025-07-10 ENCOUNTER — LAB (OUTPATIENT)
Dept: UROLOGY | Age: 70
End: 2025-07-10

## 2025-07-10 DIAGNOSIS — C61 MALIGNANT NEOPLASM OF PROSTATE (HCC): ICD-10-CM

## 2025-07-11 LAB — PSA SERPL DL<=0.01 NG/ML-MCNC: 0.03 NG/ML (ref 0–4)

## 2025-07-15 ENCOUNTER — OFFICE VISIT (OUTPATIENT)
Dept: UROLOGY | Age: 70
End: 2025-07-15
Payer: MEDICARE

## 2025-07-15 DIAGNOSIS — C61 MALIGNANT NEOPLASM OF PROSTATE (HCC): Primary | ICD-10-CM

## 2025-07-15 LAB
BILIRUBIN, URINE, POC: NEGATIVE
BLOOD URINE, POC: NEGATIVE
GLUCOSE URINE, POC: NEGATIVE MG/DL
KETONES, URINE, POC: NEGATIVE MG/DL
LEUKOCYTE ESTERASE, URINE, POC: NEGATIVE
NITRITE, URINE, POC: NEGATIVE
PH, URINE, POC: 5.5 (ref 4.6–8)
PROTEIN,URINE, POC: NEGATIVE MG/DL
SPECIFIC GRAVITY, URINE, POC: 1.03 (ref 1–1.03)
URINALYSIS CLARITY, POC: NORMAL
URINALYSIS COLOR, POC: NORMAL
UROBILINOGEN, POC: NORMAL MG/DL

## 2025-07-15 PROCEDURE — G8427 DOCREV CUR MEDS BY ELIG CLIN: HCPCS | Performed by: UROLOGY

## 2025-07-15 PROCEDURE — 3017F COLORECTAL CA SCREEN DOC REV: CPT | Performed by: UROLOGY

## 2025-07-15 PROCEDURE — 1160F RVW MEDS BY RX/DR IN RCRD: CPT | Performed by: UROLOGY

## 2025-07-15 PROCEDURE — 81003 URINALYSIS AUTO W/O SCOPE: CPT | Performed by: UROLOGY

## 2025-07-15 PROCEDURE — G8421 BMI NOT CALCULATED: HCPCS | Performed by: UROLOGY

## 2025-07-15 PROCEDURE — 1036F TOBACCO NON-USER: CPT | Performed by: UROLOGY

## 2025-07-15 PROCEDURE — 99213 OFFICE O/P EST LOW 20 MIN: CPT | Performed by: UROLOGY

## 2025-07-15 PROCEDURE — 1159F MED LIST DOCD IN RCRD: CPT | Performed by: UROLOGY

## 2025-07-15 PROCEDURE — 1123F ACP DISCUSS/DSCN MKR DOCD: CPT | Performed by: UROLOGY

## 2025-07-15 NOTE — PROGRESS NOTES
[facility]  [address] [place] [address]  [phone]    Jaylen Castillo  : 1955     HPI   70 y.o., male returns in follow up for CaP.  S/P SBRT on 3/20/19 for Patricia 7 disease under direction of Dr. Justice.  Pretx PSA was 5.4.  PSA was 1.08 on 19; 0.9 on 20; 1.0 20; 1.2 on 20; 0.8 on 10/6/21; 0.2 on 22; 0.097 on 23; 0.056 on 7/10/24 and is now 0.033 on 7/10/25.  He denies any recurrent UTI's or pneumaturia.  Cysto on 19 showed mild radiation cystitis changes.  Voiding well on Flomax with occ nocturia.  Unable to titrate off in past.       Past Medical History:   Diagnosis Date    Agatston coronary artery calcium score between 100 and 199 2016    Calcium score of 665- f/u with Cardio    DVT (deep venous thrombosis) ([facility]) 2011: LLE DVT    H/O complete eye exam 2021    [facility]    Hypercholesteremia     Hyperlipidemia 2011    Hypertension     Insomnia     Melanoma (HCC) 2019    Left upper leg. Followed by Dermatology.     Prostate cancer (HCC) Dx 2018    Cyber-knife radiation from Feb-2019    Recurrent UTI     s/p radiation for prostate ca    Squamous cell carcinoma     on the penis     Past Surgical History:   Procedure Laterality Date    COLONOSCOPY      f/u 5 years    MALIGNANT SKIN LESION EXCISION  2019    Melanoma removed from left upper leg.     ORTHOPEDIC SURGERY Bilateral     Achilles Tendon Repair    OTHER SURGICAL HISTORY      Squamous cell ca removed from penis    REPAIR/GRAFT ACHILLES TENDON      right ; left 10/2011    UPPER GASTROINTESTINAL ENDOSCOPY  2011    UROLOGICAL SURGERY  2019    Fiducial Marker and placement of space oar gel (for treatment of prostate ca)     Current Outpatient Medications   Medication Sig Dispense Refill    pantoprazole (PROTONIX) 40 MG tablet Take 1 tablet by mouth daily      tiZANidine (ZANAFLEX) 2 MG tablet Take 1 tablet by mouth 3 times